# Patient Record
Sex: MALE | Race: WHITE | NOT HISPANIC OR LATINO
[De-identification: names, ages, dates, MRNs, and addresses within clinical notes are randomized per-mention and may not be internally consistent; named-entity substitution may affect disease eponyms.]

---

## 2017-01-23 ENCOUNTER — APPOINTMENT (OUTPATIENT)
Dept: OTOLARYNGOLOGY | Facility: CLINIC | Age: 77
End: 2017-01-23

## 2017-01-23 VITALS
DIASTOLIC BLOOD PRESSURE: 77 MMHG | WEIGHT: 175 LBS | HEIGHT: 72 IN | BODY MASS INDEX: 23.7 KG/M2 | SYSTOLIC BLOOD PRESSURE: 184 MMHG

## 2017-07-17 ENCOUNTER — APPOINTMENT (OUTPATIENT)
Dept: OTOLARYNGOLOGY | Facility: CLINIC | Age: 77
End: 2017-07-17

## 2017-07-17 VITALS — WEIGHT: 175 LBS | HEIGHT: 72 IN | BODY MASS INDEX: 23.7 KG/M2

## 2017-07-17 DIAGNOSIS — H81.02 MENIERE'S DISEASE, LEFT EAR: ICD-10-CM

## 2017-07-17 DIAGNOSIS — H90.5 UNSPECIFIED SENSORINEURAL HEARING LOSS: ICD-10-CM

## 2018-07-16 ENCOUNTER — APPOINTMENT (OUTPATIENT)
Dept: OTOLARYNGOLOGY | Facility: CLINIC | Age: 78
End: 2018-07-16

## 2018-08-25 ENCOUNTER — INPATIENT (INPATIENT)
Facility: HOSPITAL | Age: 78
LOS: 3 days | Discharge: HOME | End: 2018-08-29
Attending: INTERNAL MEDICINE | Admitting: INTERNAL MEDICINE
Payer: MEDICARE

## 2018-08-25 VITALS
TEMPERATURE: 97 F | HEART RATE: 76 BPM | SYSTOLIC BLOOD PRESSURE: 208 MMHG | OXYGEN SATURATION: 100 % | RESPIRATION RATE: 18 BRPM | DIASTOLIC BLOOD PRESSURE: 108 MMHG

## 2018-08-25 DIAGNOSIS — R94.31 ABNORMAL ELECTROCARDIOGRAM [ECG] [EKG]: ICD-10-CM

## 2018-08-25 DIAGNOSIS — I63.9 CEREBRAL INFARCTION, UNSPECIFIED: ICD-10-CM

## 2018-08-25 DIAGNOSIS — Z98.49 CATARACT EXTRACTION STATUS, UNSPECIFIED EYE: Chronic | ICD-10-CM

## 2018-08-25 DIAGNOSIS — S83.8X2S SPRAIN OF OTHER SPECIFIED PARTS OF LEFT KNEE, SEQUELA: ICD-10-CM

## 2018-08-25 DIAGNOSIS — I16.1 HYPERTENSIVE EMERGENCY: ICD-10-CM

## 2018-08-25 LAB
ALBUMIN SERPL ELPH-MCNC: 4.2 G/DL — SIGNIFICANT CHANGE UP (ref 3.3–5)
ALP SERPL-CCNC: 71 U/L — SIGNIFICANT CHANGE UP (ref 40–120)
ALT FLD-CCNC: 29 U/L — SIGNIFICANT CHANGE UP (ref 4–41)
APTT BLD: 29.5 SEC — SIGNIFICANT CHANGE UP (ref 27.5–37.4)
AST SERPL-CCNC: 50 U/L — HIGH (ref 4–40)
BASOPHILS # BLD AUTO: 0.06 K/UL — SIGNIFICANT CHANGE UP (ref 0–0.2)
BASOPHILS NFR BLD AUTO: 0.7 % — SIGNIFICANT CHANGE UP (ref 0–2)
BILIRUB SERPL-MCNC: 0.5 MG/DL — SIGNIFICANT CHANGE UP (ref 0.2–1.2)
BLD GP AB SCN SERPL QL: NEGATIVE — SIGNIFICANT CHANGE UP
BUN SERPL-MCNC: 20 MG/DL — SIGNIFICANT CHANGE UP (ref 7–23)
CALCIUM SERPL-MCNC: 9.5 MG/DL — SIGNIFICANT CHANGE UP (ref 8.4–10.5)
CHLORIDE SERPL-SCNC: 104 MMOL/L — SIGNIFICANT CHANGE UP (ref 98–107)
CHOLEST SERPL-MCNC: 219 MG/DL — HIGH (ref 120–199)
CO2 SERPL-SCNC: 25 MMOL/L — SIGNIFICANT CHANGE UP (ref 22–31)
CREAT SERPL-MCNC: 0.87 MG/DL — SIGNIFICANT CHANGE UP (ref 0.5–1.3)
EOSINOPHIL # BLD AUTO: 0.14 K/UL — SIGNIFICANT CHANGE UP (ref 0–0.5)
EOSINOPHIL NFR BLD AUTO: 1.6 % — SIGNIFICANT CHANGE UP (ref 0–6)
FOLATE SERPL-MCNC: > 20 NG/ML — HIGH (ref 4.7–20)
GLUCOSE SERPL-MCNC: 105 MG/DL — HIGH (ref 70–99)
HCT VFR BLD CALC: 45.5 % — SIGNIFICANT CHANGE UP (ref 39–50)
HDLC SERPL-MCNC: 47 MG/DL — SIGNIFICANT CHANGE UP (ref 35–55)
HGB BLD-MCNC: 15.6 G/DL — SIGNIFICANT CHANGE UP (ref 13–17)
IMM GRANULOCYTES # BLD AUTO: 0.03 # — SIGNIFICANT CHANGE UP
IMM GRANULOCYTES NFR BLD AUTO: 0.3 % — SIGNIFICANT CHANGE UP (ref 0–1.5)
INR BLD: 0.94 — SIGNIFICANT CHANGE UP (ref 0.88–1.17)
LIPID PNL WITH DIRECT LDL SERPL: 165 MG/DL — SIGNIFICANT CHANGE UP
LYMPHOCYTES # BLD AUTO: 2 K/UL — SIGNIFICANT CHANGE UP (ref 1–3.3)
LYMPHOCYTES # BLD AUTO: 22.9 % — SIGNIFICANT CHANGE UP (ref 13–44)
MCHC RBC-ENTMCNC: 33.1 PG — SIGNIFICANT CHANGE UP (ref 27–34)
MCHC RBC-ENTMCNC: 34.3 % — SIGNIFICANT CHANGE UP (ref 32–36)
MCV RBC AUTO: 96.6 FL — SIGNIFICANT CHANGE UP (ref 80–100)
MONOCYTES # BLD AUTO: 0.65 K/UL — SIGNIFICANT CHANGE UP (ref 0–0.9)
MONOCYTES NFR BLD AUTO: 7.5 % — SIGNIFICANT CHANGE UP (ref 2–14)
NEUTROPHILS # BLD AUTO: 5.84 K/UL — SIGNIFICANT CHANGE UP (ref 1.8–7.4)
NEUTROPHILS NFR BLD AUTO: 67 % — SIGNIFICANT CHANGE UP (ref 43–77)
NRBC # FLD: 0 — SIGNIFICANT CHANGE UP
PLATELET # BLD AUTO: 197 K/UL — SIGNIFICANT CHANGE UP (ref 150–400)
PMV BLD: 10.9 FL — SIGNIFICANT CHANGE UP (ref 7–13)
POTASSIUM SERPL-MCNC: 4.9 MMOL/L — SIGNIFICANT CHANGE UP (ref 3.5–5.3)
POTASSIUM SERPL-SCNC: 4.9 MMOL/L — SIGNIFICANT CHANGE UP (ref 3.5–5.3)
PROT SERPL-MCNC: 7.5 G/DL — SIGNIFICANT CHANGE UP (ref 6–8.3)
PROTHROM AB SERPL-ACNC: 10.8 SEC — SIGNIFICANT CHANGE UP (ref 9.8–13.1)
RBC # BLD: 4.71 M/UL — SIGNIFICANT CHANGE UP (ref 4.2–5.8)
RBC # FLD: 12 % — SIGNIFICANT CHANGE UP (ref 10.3–14.5)
RH IG SCN BLD-IMP: POSITIVE — SIGNIFICANT CHANGE UP
SODIUM SERPL-SCNC: 142 MMOL/L — SIGNIFICANT CHANGE UP (ref 135–145)
TRIGL SERPL-MCNC: 91 MG/DL — SIGNIFICANT CHANGE UP (ref 10–149)
VIT B12 SERPL-MCNC: 2000 PG/ML — HIGH (ref 200–900)
WBC # BLD: 8.72 K/UL — SIGNIFICANT CHANGE UP (ref 3.8–10.5)
WBC # FLD AUTO: 8.72 K/UL — SIGNIFICANT CHANGE UP (ref 3.8–10.5)

## 2018-08-25 PROCEDURE — 33282: CPT

## 2018-08-25 PROCEDURE — 70450 CT HEAD/BRAIN W/O DYE: CPT | Mod: 26,59

## 2018-08-25 PROCEDURE — 70496 CT ANGIOGRAPHY HEAD: CPT | Mod: 26

## 2018-08-25 PROCEDURE — 70551 MRI BRAIN STEM W/O DYE: CPT | Mod: 26

## 2018-08-25 PROCEDURE — 70498 CT ANGIOGRAPHY NECK: CPT | Mod: 26

## 2018-08-25 PROCEDURE — 99223 1ST HOSP IP/OBS HIGH 75: CPT

## 2018-08-25 RX ORDER — ATORVASTATIN CALCIUM 80 MG/1
80 TABLET, FILM COATED ORAL AT BEDTIME
Qty: 0 | Refills: 0 | Status: DISCONTINUED | OUTPATIENT
Start: 2018-08-25 | End: 2018-08-29

## 2018-08-25 RX ORDER — CLOPIDOGREL BISULFATE 75 MG/1
75 TABLET, FILM COATED ORAL DAILY
Qty: 0 | Refills: 0 | Status: DISCONTINUED | OUTPATIENT
Start: 2018-08-25 | End: 2018-08-29

## 2018-08-25 RX ORDER — ASPIRIN/CALCIUM CARB/MAGNESIUM 324 MG
81 TABLET ORAL DAILY
Qty: 0 | Refills: 0 | Status: DISCONTINUED | OUTPATIENT
Start: 2018-08-25 | End: 2018-08-29

## 2018-08-25 RX ORDER — LABETALOL HCL 100 MG
10 TABLET ORAL ONCE
Qty: 0 | Refills: 0 | Status: COMPLETED | OUTPATIENT
Start: 2018-08-25 | End: 2018-08-25

## 2018-08-25 RX ADMIN — Medication 10 MILLIGRAM(S): at 11:20

## 2018-08-25 NOTE — H&P ADULT - PROBLEM SELECTOR PLAN 5
- no pain, tenderness, difficulty with ROM at present  - reports undergoing MRI study of said knee in the recent past  - patient reports temporary limitations in exercising due to same  - evaluate need for physical therapy

## 2018-08-25 NOTE — ED PROVIDER NOTE - MEDICAL DECISION MAKING DETAILS
Pt is a 79 y/o M nonsmoker PMHx cataract surgery p/w difficulty coordinating fingers x 3.5 hrs -- difficulty coordinating without objective neuro deficits -- labs, code stroke Pt is a 77 y/o M nonsmoker PMHx cataract surgery p/w difficulty coordinating fingers x 3.5 hrs -- difficulty coordinating without objective neuro deficits -- labs, code stroke, Bp contro.

## 2018-08-25 NOTE — H&P ADULT - PROBLEM SELECTOR PLAN 1
- presented with difficulty in maintaining fluid movements of fingers while typing  - BP = 208/108 on admission (HR = 76)  - MRI with findings of multiple foci of emboli w/o hemorrhage  - s/p Stroke code and now being followed by neurology team (appreciated)  - recommended for plavix, aspirin, simvastatin by Neuro; patient refuses all, despite understanding indications, benefits, risks  - patient takes multiple high dose supplements (see below), and believes he does not need the prescribed meds; thinks that his lipid panel differential is at target for age  - f/u blood cultures, TTE (as recommended by Neuro)  - pls call cardiology consult for Loop recorder (as recommended by Neuro)  - f/u AM lab-work  - neurological checks - presented with difficulty in maintaining fluid movements of fingers while typing  - BP = 208/108 on admission (HR = 76)  - MRI with findings of multiple foci of emboli w/o hemorrhage  - s/p Stroke code and now being followed by neurology team (appreciated)  - recommended for plavix, aspirin, simvastatin by Neuro; patient refuses all, despite understanding indications, benefits, risks  - patient takes multiple high dose supplements (see below), and believes he does not need the prescribed meds; thinks that his lipid panel differential is at target for age  - f/u blood cultures, TTE (as recommended by Neuro)  - pls call cardiology consult for Loop recorder (as recommended by Neuro)  - f/u AM lab-work (lipid panel, HgbA1c, vitamin B12, folate levels already done_  - neurological checks  - continues on telemetry monitoring - presented with difficulty in maintaining fluid movements of fingers while typing  - BP = 208/108 on admission (HR = 76)  - MRI with findings of multiple foci of emboli w/o hemorrhage  - s/p Stroke code and now being followed by neurology team (appreciated)  - recommended for plavix, aspirin, simvastatin by Neuro; patient refuses all, despite understanding indications, benefits, risks  - patient takes multiple high dose supplements (see below), and believes he does not need the prescribed meds; thinks that his lipid panel differential is at target for age  - f/u blood cultures, TTE (as recommended by Neuro)  - pls call cardiology consult for Loop recorder (as recommended by Neuro)  - f/u AM lab-work (lipid panel, HgbA1c, vitamin B12, folate levels already done_  - neurological checks  - continues on telemetry monitoring  - passed swallow eval  - F/U PT/OT eval

## 2018-08-25 NOTE — H&P ADULT - PROBLEM SELECTOR PLAN 2
- BP to 208/108 on admission; not on antihypertensive medications at home  - patient reports history of labile blood pressure, with normal values being 120 to 130 systolic and 60 to 70 diastolic.  During stressful periods, however, BP significantly elevation.  Current elevation explained as being due to the assumption that he was having a CVA at the time of coming to the ED  - s/p labetalol 10 mg IV x one in the ED  - SBP from 129 to 156 since being in the Hospital  - Neurology team recommends permissive HTN of  to 180 for 24 to 48 hours  - ECG as above  - f/u with repeat measurements - BP to 208/108 on admission and now with CVA; not on antihypertensive medications at home  - patient reports history of labile blood pressure, with normal values being 120 to 130 systolic and 60 to 70 diastolic.  During stressful periods, however, BP significantly elevation.  Current elevation explained as being due to the assumption that he was having a CVA at the time of coming to the ED  - s/p labetalol 10 mg IV x one in the ED  - SBP from 129 to 156 since being in the Hospital  - Neurology team recommends permissive HTN of  to 180 for 24 to 48 hours  - ECG as above  - f/u with repeat measurements

## 2018-08-25 NOTE — ED ADULT NURSE NOTE - OBJECTIVE STATEMENT
pt received to room 22 A/O x 3 c/o uncoordination in left and right hands. Pt noticed more uncoordination to his right hand. Pt states he was typing and noticed he wasn't able to hit the proper keys. No facal droop noted. Pt able to move with equal strength and sensation bilateral upper and lower extremities without difficulty. Respirations even and unlabored. IV placed labs drawn and sent. Code stroke called. Pt sent to CT Scan. pt received to room 22 A/O x 3 c/o uncoordination in left and right hands. Pt noticed more uncoordination to his right hand. Pt states he was typing and noticed he wasn't able to hit the proper keys. No facal droop noted. Pt able to move with equal strength and sensation bilateral upper and lower extremities without difficulty. Respirations even and unlabored. IV placed labs drawn and sent. MD evaluated pt and Code stroke called. Pt sent to CT Scan. pt received to room 22 A/O x 3 c/o uncoordination in left and right hands. Pt noticed more uncoordination to his right hand. Pt states he was typing and noticed he wasn't able to hit the proper keys. PERRLA. No facial droop noted. Pt able to move with equal strength and sensation bilateral upper and lower extremities without difficulty. Respirations even and unlabored. Lung sounds clear with equal chest rise bilaterally. ABD is soft, non tender, non distended with normal active bowel sounds.  IV placed labs drawn and sent. MD evaluated pt and Code stroke called. Pt sent to CT Scan.

## 2018-08-25 NOTE — CHART NOTE - NSCHARTNOTEFT_GEN_A_CORE
MRI Brain reviewed.    < from: MR Head No Cont (08.25.18 @ 15:54) >    IMPRESSION: Scattered infarcts in the right occipital lobe, left centrum   semiovale ovale and bridgette suspicious for multiple emboli. No acute   hemorrhage.Atrophy and small vessel white matter ischemic changes.    < end of copied text >    Multi vessel shower emboli, cardioembolic source likely 2/2 to afib until proven otherwise  - d/w patient and primary team in CDU  - Admit to Tele for further stroke w/u  - Please send set of blood cultures   - TTE w/ bubble study  - atorvastatin 80  - c/w aspirin 81, plavix 75 for 3 weeks then aspirin alone  - Cardiology eval for Loop recorder  - will follow MRI Brain reviewed.    < from: MR Head No Cont (08.25.18 @ 15:54) >    IMPRESSION: Scattered infarcts in the right occipital lobe, left centrum   semiovale ovale and bridgette suspicious for multiple emboli. No acute   hemorrhage.Atrophy and small vessel white matter ischemic changes.    < end of copied text >    Multi vessel shower emboli, cardioembolic source likely 2/2 to afib until proven otherwise  - d/w patient and primary team in CDU  - Admit to Tele for further stroke w/u  - Please send set of blood cultures   - TTE w/ bubble study  - atorvastatin 80  - c/w aspirin 81, plavix 75 for 3 weeks then aspirin alone  - Cardiology eval for Loop recorder  - PT/OT   - allow permissive HTN 24-48 hours -180  - neuro to follow

## 2018-08-25 NOTE — CONSULT NOTE ADULT - ATTENDING COMMENTS
I have examined the pt at bedside.  The risks and the benefits of the proposed diagnostic/therapeutic approach were thoroughly discussed.   I agree with the above plan and have modified it where necessary.

## 2018-08-25 NOTE — ED ADULT TRIAGE NOTE - CHIEF COMPLAINT QUOTE
done during down time charting/  r/o cva pt unsteady c/o feeling off/ hands shaking pt evaluated by fit doc  no code stroke called but pt to room

## 2018-08-25 NOTE — ED ADULT NURSE NOTE - CHPI ED NUR SYMPTOMS NEG
no nausea/no vomiting/no weakness/no blurred vision/no dizziness/no loss of consciousness/no numbness/no change in level of consciousness/no confusion/no fever

## 2018-08-25 NOTE — H&P ADULT - NSHPLABSRESULTS_GEN_ALL_CORE
15.6   8.72  )-----------( 197      ( 25 Aug 2018 10:49 )             45.5       08-25    142  |  104  |  20  ----------------------------<  105<H>  4.9   |  25  |  0.87    Ca    9.5      25 Aug 2018 10:49    TPro  7.5  /  Alb  4.2  /  TBili  0.5  /  DBili  x   /  AST  50<H>  /  ALT  29  /  AlkPhos  71  08-25          PT/INR - ( 25 Aug 2018 10:49 )   PT: 10.8 SEC;   INR: 0.94          PTT - ( 25 Aug 2018 10:49 )  PTT:29.5 SEC    Lactate Trend      CAPILLARY BLOOD GLUCOSE  101 (25 Aug 2018 11:19)      POCT Blood Glucose.: 101 mg/dL (25 Aug 2018 10:37)        =============================================================    MRI - BRAIN    PROCEDURE DATE:  Aug 25 2018       INTERPRETATION:    CLINICAL INDICATION: Difficulty with coordination of bilateral hands      Magnetic resonance imaging of the brain was carried out with transaxial   SPGR, FLAIR, fast spin echo T2 weighted images, axial susceptibility   weighted series, diffusion weighted series and sagittal T1 weighted   series on a 1.5 Ai magnet.    Comparison is made with the prior CT/CTA of earlier today.    Atrophy is identified with ventricular and sulcal prominence.    There are multiple tiny scattered infarcts on diffusion-weighted imaging   in the left centrum semiovale ovale, adjacent to the left lateral   ventricle, and the left parietal white matter and in the right occipital   region. Additionally there are a few scattered apparent areas of   restriction in the bridgette bilaterally. These are suspicious for embolic   infarcts in view of the multiple vascular territory involvement. Small   vessel white matter ischemic changes are noted.    There is no acute hemorrhage.    Sellar and parasellar structures are unremarkable.      IMPRESSION: Scattered infarcts in the right occipital lobe, left centrum   semiovale ovale and bridgette suspicious for multiple emboli. No acute   hemorrhage.Atrophy and small vessel white matter ischemic changes.    Findings stress with Dr. Mon by Dr. Weiss at  time of dictation with   read back    < end of copied text >        ------------------------------------------------------------------------------------------ 15.6   8.72  )-----------( 197      ( 25 Aug 2018 10:49 )             45.5       08-25    142  |  104  |  20  ----------------------------<  105<H>  4.9   |  25  |  0.87    Ca    9.5      25 Aug 2018 10:49    TPro  7.5  /  Alb  4.2  /  TBili  0.5  /  DBili  x   /  AST  50<H>  /  ALT  29  /  AlkPhos  71  08-25          PT/INR - ( 25 Aug 2018 10:49 )   PT: 10.8 SEC;   INR: 0.94          PTT - ( 25 Aug 2018 10:49 )  PTT:29.5 SEC    Lactate Trend      CAPILLARY BLOOD GLUCOSE  101 (25 Aug 2018 11:19)      POCT Blood Glucose.: 101 mg/dL (25 Aug 2018 10:37)        =============================================================    MRI - BRAIN    PROCEDURE DATE:  Aug 25 2018       INTERPRETATION:    CLINICAL INDICATION: Difficulty with coordination of bilateral hands      Magnetic resonance imaging of the brain was carried out with transaxial   SPGR, FLAIR, fast spin echo T2 weighted images, axial susceptibility   weighted series, diffusion weighted series and sagittal T1 weighted   series on a 1.5 Ai magnet.    Comparison is made with the prior CT/CTA of earlier today.    Atrophy is identified with ventricular and sulcal prominence.    There are multiple tiny scattered infarcts on diffusion-weighted imaging   in the left centrum semiovale ovale, adjacent to the left lateral   ventricle, and the left parietal white matter and in the right occipital   region. Additionally there are a few scattered apparent areas of   restriction in the bridgette bilaterally. These are suspicious for embolic   infarcts in view of the multiple vascular territory involvement. Small   vessel white matter ischemic changes are noted.    There is no acute hemorrhage.    Sellar and parasellar structures are unremarkable.      IMPRESSION: Scattered infarcts in the right occipital lobe, left centrum   semiovale ovale and bridgette suspicious for multiple emboli. No acute   hemorrhage.Atrophy and small vessel white matter ischemic changes.    Findings stress with Dr. Mon by Dr. Weiss at  time of dictation with   read back    < end of copied text >      ------------------------------------------------------------------------------------------      ECG w/ NSR at 73 bpm, LAD, incomplete LBBB, small U-waves

## 2018-08-25 NOTE — ED CDU PROVIDER DISPOSITION NOTE - CLINICAL COURSE
Attending Contribution to Care: Patient is a 77 yo M here for further evaluation of questionable TIA. Patient reportedly states he couldn't move his fingers to touch the keys on the keyboard. Episode lasted about 15 min. He is differentiating this from strange sensation in both of his shoulders which is unable to further describe, but states this lasted a few hours. He states he has no symptoms at the time of my interview. Denies chest pain, sob, headache, weakness, fevers, chills. Patient was seen by neurology, had a CTA of his head and neck done with no acute findings. He is in CDU for MRI head, neuro checks and tele monitoring. Patient had MRI head which showed multiple emboli in right occipital region. Discussed with neuro who requested admission. Neurology resident spoke to Dr. Red and patient admitted to tele.

## 2018-08-25 NOTE — ED ADULT NURSE NOTE - NSIMPLEMENTINTERV_GEN_ALL_ED
Implemented All Universal Safety Interventions:  [place] to call system. Call bell, personal items and telephone within reach. Instruct patient to call for assistance. Room bathroom lighting operational. Non-slip footwear when patient is off stretcher. Physically safe environment: no spills, clutter or unnecessary equipment. Stretcher in lowest position, wheels locked, appropriate side rails in place.

## 2018-08-25 NOTE — H&P ADULT - NEGATIVE NEUROLOGICAL SYMPTOMS
no tremors/no vertigo/no loss of consciousness/no syncope/no difficulty walking/no confusion/no headache

## 2018-08-25 NOTE — H&P ADULT - FAMILY HISTORY
No pertinent family history in first degree relatives Mother  Still living? No  Family history of mental disorder, Age at diagnosis: Age Unknown     Father  Still living? No  Family history of heart disease, Age at diagnosis: Age Unknown

## 2018-08-25 NOTE — H&P ADULT - PROBLEM SELECTOR PLAN 3
- ECG w/ NSR at 73 bpm, LAD, incomplete LBBB, small U-waves  - no reports of palpitations, chest pain, shortness of breath  - findings may be chronic in patient who is very active (ran 3 marathons and works out constantly)  - does not have a PMD  - cardiology consult already indicated for Loop recorder

## 2018-08-25 NOTE — CONSULT NOTE ADULT - SUBJECTIVE AND OBJECTIVE BOX
[facility] NEUROLOGY CONSULT SERVICE  ROE BENNETT  Male  MRN-[id number]    HPI: 79 y/o M nonsmoker PMHx undiagnosed HTN, cataract surgery p/w difficulty coordinating fingers x 3.5 hrs.  Pt notes ~ 7AM while at computer, typing, pt states he developed difficulty "typing as fast as usual" with difficulty worse with right hand.  Pt also notes "feeling funny" at the skin around his head and neck, but cannot elaborate further.  Pt notes symptoms minimally improved at this time, stating he is able to move finger and hands faster than before, but slower than baseline. Pt was noted to have SBP in lower 200s as per ED team.   At the time of my eval, Pt reports he feels like his usual self, confirmed by wife also. No facial asymmetry dysarthria, aphasia or gross abnormalities reported. Very mild subjective diminished sensation to light touch only in left foot dorsal surface. Pt states his BP was in 180s systolic earlier this morning but usually is in 120-130s range. He has not followed with any doctors in a long time and does not take any medications except for vitamin/supplements.     Pt denies any fevers, chills,  HA, dizziness, palpitations, nausea, vomiting, chest pain, SOB, headache, numbness or tingling, weakness, head trauma, use of blood thinners, illicit drug use.    NIHSS =1, MRS= 0    PAST MEDICAL & SURGICAL HISTORY:  No pertinent past medical history  S/P cataract surgery, HTN    MEDICATIONS  (STANDING):    MEDICATIONS  (PRN):    Allergies    No Known Allergies    Intolerances        SOCIAL HISTORY:      VITAL SIGNS:  Vital Signs Last 24 Hrs  T(C): 36.7 (25 Aug 2018 12:50), Max: 36.7 (25 Aug 2018 12:50)  T(F): 98.1 (25 Aug 2018 12:50), Max: 98.1 (25 Aug 2018 12:50)  HR: 60 (25 Aug 2018 12:50) (58 - 76)  BP: 142/70 (25 Aug 2018 12:50) (142/70 - 208/108)  BP(mean): --  RR: 16 (25 Aug 2018 12:50) (16 - 18)  SpO2: 98% (25 Aug 2018 12:50) (97% - 100%)    PHYSICAL EXAMINATION:  General: Well-developed, well nourished, in no acute distress. yellowing dentition  Eyes: Conjunctiva and sclera clear.  Neurologic:  - Mental Status:  Alert, awake, oriented to person, place, and time; Speech is fluent with intact naming, repetition, and comprehension; Immediate recall is 3/3 words and delayed recall is 3/3 words at 5 minutes;   - Cranial Nerves II-XII:    II:  VFF, Pupils are equal, round, and reactive to light.  III, IV, VI:  Extraocular movements are intact without nystagmus.  V:  Facial sensation is intact in the V1-V3 distribution bilaterally.  VII:  Face is symmetric with normal eye closure and smile  VIII:  Hearing is intact to finger rub.  IX, X:  Uvula is midline and soft palate rises symmetrically  XI:  Head turning and shoulder shrug are intact.  XII:  Tongue protrudes in the midline.  - Motor:  Strength is 5/5 throughout.  There is no pronator drift.  Normal muscle bulk and tone throughout.  - Reflexes:  2+ and symmetric at the biceps, triceps, brachioradialis, knees, and ankles.  Plantar responses flexor.  - Sensory:  Intact to light touch, pin prick, throughout except for mildly diminished sense on dorsal surface of left  foot  - Coordination:  Finger-nose-finger and heel-knee-shin intact without dysmetria.  Rapid alternating hand movements mild difficulty  - Gait:   deferred    LABS:                          15.6   8.72  )-----------( 197      ( 25 Aug 2018 10:49 )left              45.5     08-25    142  |  104  |  20  ----------------------------<  105<H>  4.9   |  25  |  0.87    Ca    9.5      25 Aug 2018 10:49    TPro  7.5  /  Alb  4.2  /  TBili  0.5  /  DBili  x   /  AST  50<H>  /  ALT  29  /  AlkPhos  71  08-25      RADIOLOGY & ADDITIONAL STUDIES:   < from: CT Brain Stroke Protocol (08.25.18 @ 10:56) >  IMPRESSION:    No acute intracranial hemorrhage, mass effect or midline shift.    The above findings were discussed with Dr. Mon by Dr. Deleon on 8/25/2018   10:55 AM, with read-back verification.    < end of copied text >     < from: CT Angio Neck w/ IV Cont (08.25.18 @ 10:56) >  IMPRESSION:     CTA NECK: No evidence of hemodynamically significant stenosis using   NASCET criteria. No evidence of arterial dissection.    CTA BRAIN: No major vessel occlusion or proximal stenosis.  Normal   anatomic variants as discussed in the body the report.    < end of copied text >      A/P   79 y/o M nonsmoker PMHx undiagnosed HTN, cataract surgery p/w difficulty coordinating fingers that occurred around 7am, now back to his baseline. Otherwise no focal deficits noted except for mild sensory deficit to light touch in dorsal surface of left foot. CT/CTA without acute findings. NIHSS =1, MRS =0    - place in CDU for Telemonitoring  - MRI Brain w/o contrast  - aspirin 81 daily  - plavix 75mg (to continue x 3 weeks)   - Lipid panel, HgA1c  - Kindly inform us if pt's condition/situation changes  - will follow    Thank you for involving us in the care of this patient. [facility] NEUROLOGY CONSULT SERVICE  ROE BENNETT  Male  MRN-[id number]    HPI: 79 y/o M nonsmoker PMHx undiagnosed HTN, cataract surgery p/w difficulty coordinating fingers x 3.5 hrs.  Pt notes ~ 7AM while at computer, typing, pt states he developed difficulty "typing as fast as usual" with difficulty worse with right hand.  Pt also notes "feeling funny" at the skin around his head and neck, but cannot elaborate further.  Pt notes symptoms minimally improved at this time, stating he is able to move finger and hands faster than before, but slower than baseline. Pt was noted to have SBP in lower 200s as per ED team.   At the time of my eval, Pt reports he feels like his usual self, confirmed by wife also. No facial asymmetry dysarthria, aphasia or gross abnormalities reported. Very mild subjective diminished sensation to light touch only in left foot dorsal surface. Pt states his BP was in 180s systolic earlier this morning but usually is in 120-130s range. He has not followed with any doctors in a long time and does not take any medications except for vitamin/supplements.     Pt denies any fevers, chills,  HA, dizziness, palpitations, nausea, vomiting, chest pain, SOB, headache, numbness or tingling, weakness, head trauma, use of blood thinners, illicit drug use.    NIHSS =1, MRS= 0    PAST MEDICAL & SURGICAL HISTORY:  No pertinent past medical history  S/P cataract surgery, HTN    MEDICATIONS  (STANDING):    MEDICATIONS  (PRN):    Allergies    No Known Allergies    Intolerances        SOCIAL HISTORY:      VITAL SIGNS:  Vital Signs Last 24 Hrs  T(C): 36.7 (25 Aug 2018 12:50), Max: 36.7 (25 Aug 2018 12:50)  T(F): 98.1 (25 Aug 2018 12:50), Max: 98.1 (25 Aug 2018 12:50)  HR: 60 (25 Aug 2018 12:50) (58 - 76)  BP: 142/70 (25 Aug 2018 12:50) (142/70 - 208/108)  BP(mean): --  RR: 16 (25 Aug 2018 12:50) (16 - 18)  SpO2: 98% (25 Aug 2018 12:50) (97% - 100%)    PHYSICAL EXAMINATION:  General: Well-developed, well nourished, in no acute distress. yellowing dentition  Eyes: Conjunctiva and sclera clear.  Neurologic:  - Mental Status:  Alert, awake, oriented to person, place, and time; Speech is fluent with intact naming, repetition, and comprehension; Immediate recall is 3/3 words and delayed recall is 3/3 words at 5 minutes;   - Cranial Nerves II-XII:    II:  VFF, Pupils are equal, round, and reactive to light.  III, IV, VI:  Extraocular movements are intact without nystagmus.  V:  Facial sensation is intact in the V1-V3 distribution bilaterally.  VII:  Face is symmetric with normal eye closure and smile  VIII:  Hearing is intact to finger rub.  IX, X:  Uvula is midline and soft palate rises symmetrically  XI:  Head turning and shoulder shrug are intact.  XII:  Tongue protrudes in the midline.  - Motor:  Strength is 5/5 throughout.  There is no pronator drift.  Normal muscle bulk and tone throughout.  - Reflexes:  2+ and symmetric at the biceps, triceps, brachioradialis, knees, and ankles.  Plantar responses flexor.  - Sensory:  Intact to light touch, pin prick, throughout except for mildly diminished sense on dorsal surface of left  foot  - Coordination:  Finger-nose-finger and heel-knee-shin intact without dysmetria.  Rapid alternating hand movements mild difficulty  - Gait:   deferred    LABS:                          15.6   8.72  )-----------( 197      ( 25 Aug 2018 10:49 )left              45.5     08-25    142  |  104  |  20  ----------------------------<  105<H>  4.9   |  25  |  0.87    Ca    9.5      25 Aug 2018 10:49    TPro  7.5  /  Alb  4.2  /  TBili  0.5  /  DBili  x   /  AST  50<H>  /  ALT  29  /  AlkPhos  71  08-25      RADIOLOGY & ADDITIONAL STUDIES:   < from: CT Brain Stroke Protocol (08.25.18 @ 10:56) >  IMPRESSION:    No acute intracranial hemorrhage, mass effect or midline shift.    The above findings were discussed with Dr. Mon by Dr. Deleon on 8/25/2018   10:55 AM, with read-back verification.    < end of copied text >     < from: CT Angio Neck w/ IV Cont (08.25.18 @ 10:56) >  IMPRESSION:     CTA NECK: No evidence of hemodynamically significant stenosis using   NASCET criteria. No evidence of arterial dissection.    CTA BRAIN: No major vessel occlusion or proximal stenosis.  Normal   anatomic variants as discussed in the body the report.    < end of copied text >      A/P   79 y/o M nonsmoker PMHx undiagnosed HTN, cataract surgery p/w difficulty coordinating fingers that occurred around 7am, now back to his baseline. Otherwise no focal deficits noted except for mild sensory deficit to light touch in dorsal surface of left foot. CT/CTA without acute findings. NIHSS =1, MRS =0    - place in CDU for Telemonitoring  - MRI Brain w/o contrast  - aspirin 81 daily  - plavix 75mg (to continue x 3 weeks)   - Lipid panel, HgA1c, b12, folate  - Kindly inform us if pt's condition/situation changes  - will follow    Thank you for involving us in the care of this patient. [facility] NEUROLOGY CONSULT SERVICE  ROE BENNETT  Male  MRN-[id number]    HPI: 79 y/o M nonsmoker PMHx undiagnosed HTN, cataract surgery p/w difficulty coordinating fingers x 3.5 hrs.  Pt notes ~ 7AM while at computer, typing, pt states he developed difficulty "typing as fast as usual" with difficulty worse with right hand.  Pt also notes "feeling funny" at the skin around his head and neck, but cannot elaborate further.  Pt notes symptoms minimally improved at this time, stating he is able to move finger and hands faster than before, but slower than baseline. Pt was noted to have SBP in lower 200s as per ED team.   At the time of my eval, Pt reports he feels like his usual self, confirmed by wife also. No facial asymmetry dysarthria, aphasia or gross abnormalities reported. Very mild subjective diminished sensation to light touch only in left foot dorsal surface. Pt states his BP was in 180s systolic earlier this morning but usually is in 120-130s range. He has not followed with any doctors in a long time and does not take any medications except for vitamin/supplements.     Pt denies any fevers, chills,  HA, dizziness, palpitations, nausea, vomiting, chest pain, SOB, headache, numbness or tingling, weakness, head trauma, use of blood thinners, illicit drug use.    NIHSS =1, MRS= 0    Interval Hx: pt feels well today, no complaints.    PAST MEDICAL & SURGICAL HISTORY:  No pertinent past medical history  S/P cataract surgery, HTN    Social Hx: Lives with family. No toxic habits.   FAMILY HISTORY:  Family history of heart disease (Father): father (sudden death at age 56 years; heart disease presumed to be due to Rheumatic heart disease  Family history of mental disorder (Mother): mother ( at age 91 years)    Home Medications:  boswellia alexis: orally once a day (25 Aug 2018 22:12)  chlorpheniramine: orally once a day, As Needed (25 Aug 2018 20:11)  Ginkgo Biloba: orally once a day (25 Aug 2018 22:11)  Multiple Vitamins oral tablet: 1 tab(s) orally once a day (25 Aug 2018 22:13)  Vitamin B Complex 100:  (25 Aug 2018 22:08)  Vitamin B12:  (25 Aug 2018 22:08)  Vitamin C 1000 mg oral tablet: 6 tab(s) orally once a day (6 grams daily) (25 Aug 2018 22:08)  Vitamin D3 10,000 intl units oral capsule: 1 cap(s) orally once a day - during the summer months (25 Aug 2018 22:09)  Vitamin D3 1000 intl units oral tablet: 12 tab(s) orally once a day (12,000 IU daily during winter months) (25 Aug 2018 22:10)    MEDICATIONS  (STANDING):  aspirin enteric coated 81 milliGRAM(s) Oral daily  atorvastatin 80 milliGRAM(s) Oral at bedtime  clopidogrel Tablet 75 milliGRAM(s) Oral daily    MEDICATIONS  (PRN): none.    Allergies  No Known Allergies    GENERAL EXAM:  Constitutional: no acute distress, well nourished, well developed   Cardiovascular: RRR, S1/2 normal, no MRG  Musculoskeletal:  no clubbing or cyanosis of the fingernails, no joint swelling, no myalgia  Eyes:  normal sclera and conjunctiva and pupils, no JODIE, no APD. Fundi: no papilledema.  VITAL SIGNS:  Vital Signs Last 24 Hrs  T(C): 36.7 (25 Aug 2018 12:50), Max: 36.7 (25 Aug 2018 12:50)  T(F): 98.1 (25 Aug 2018 12:50), Max: 98.1 (25 Aug 2018 12:50)  HR: 60 (25 Aug 2018 12:50) (58 - 76)  BP: 142/70 (25 Aug 2018 12:50) (142/70 - 208/108)  RR: 16 (25 Aug 2018 12:50) (16 - 18)  SpO2: 98% (25 Aug 2018 12:50) (97% - 100%)    PHYSICAL EXAMINATION:  Neurologic:  - Mental Status:  Alert, awake, oriented to person, place, and time; Speech is fluent with intact naming, repetition, and comprehension; Immediate recall is 3/3 words and delayed recall is 3/3 words at 5 minutes;   - Cranial Nerves II-XII:    II:  VFF, Pupils are equal, round, and reactive to light.  III, IV, VI:  Extraocular movements are intact without nystagmus.  V:  Facial sensation is intact in the V1-V3 distribution bilaterally.  VII:  Face is symmetric with normal eye closure and smile  VIII:  Hearing is intact to finger rub.  IX, X:  Uvula is midline and soft palate rises symmetrically  XI:  Head turning and shoulder shrug are intact.  XII:  Tongue protrudes in the midline.  - Motor:  Strength is 5/5 throughout.  There is no pronator drift.  Normal muscle bulk and tone throughout.  - Reflexes:  2+ and symmetric at the biceps, triceps, brachioradialis, knees, and ankles.  Plantar responses flexor.  - Sensory:  Intact to light touch, pin prick, throughout except for mildly diminished sense on dorsal surface of left  foot  - Coordination:  Finger-nose-finger and heel-knee-shin intact without dysmetria.  Rapid alternating hand movements mild difficulty  - Gait:   deferred    LABS:                          15.6   8.72  )-----------( 197      ( 25 Aug 2018 10:49 )left              45.5         142  |  104  |  20  ----------------------------<  105<H>  4.9   |  25  |  0.87    Ca    9.5      25 Aug 2018 10:49    TPro  7.5  /  Alb  4.2  /  TBili  0.5  /  DBili  x   /  AST  50<H>  /  ALT  29  /  AlkPhos  71        RADIOLOGY & ADDITIONAL STUDIES:   < from: CT Brain Stroke Protocol (18 @ 10:56) >  IMPRESSION:    No acute intracranial hemorrhage, mass effect or midline shift.    The above findings were discussed with Dr. Mon by Dr. Deleon on 2018   10:55 AM, with read-back verification.    < end of copied text >     < from: CT Angio Neck w/ IV Cont (18 @ 10:56) >  IMPRESSION:     CTA NECK: No evidence of hemodynamically significant stenosis using   NASCET criteria. No evidence of arterial dissection.    CTA BRAIN: No major vessel occlusion or proximal stenosis.  Normal   anatomic variants as discussed in the body the report.    < end of copied text >    < from: MR Head No Cont (18 @ 15:54) >  Magnetic resonance imaging of the brain was carried out with transaxial   SPGR, FLAIR, fast spin echo T2 weighted images, axial susceptibility   weighted series, diffusion weighted series and sagittal T1 weighted   series on a 1.5 Ai magnet.    Comparison is made with the prior CT/CTA of earlier today.    Atrophy is identified with ventricular and sulcal prominence.    There are multiple tiny scattered infarcts on diffusion-weighted imaging   in the left centrum semiovale ovale, adjacent to the left lateral   ventricle, and the left parietal white matter and in the right occipital   region. Additionally there are a few scattered apparent areas of   restriction in the bridgette bilaterally. These are suspicious for embolic   infarcts in view of the multiple vascular territory involvement. Small   vessel white matter ischemic changes are noted.    There is no acute hemorrhage.    Sellar and parasellar structures are unremarkable.    < end of copied text >

## 2018-08-25 NOTE — H&P ADULT - NSHPSOCIALHISTORY_GEN_ALL_CORE
Lives with wife  No history of smoking   Lives with wife  Retired, but formerly worked as a ,  and other related computer technologies  No history of smoking  No history of alcohol use  No history of caffeine use  No history of illegal drug use    Independently ambulatory at baseline; runs marathons

## 2018-08-25 NOTE — ED CDU PROVIDER INITIAL DAY NOTE - OBJECTIVE STATEMENT
Pt is a 79 y/o M nonsmoker PMHx cataract surgery p/w difficulty coordinating fingers x 3.5 hrs.  Pt notes 3.5 hrs ago while at computer, typing, pt states he developed difficulty "typing as fast as usual" with difficulty worse with right hand.  Pt also notes "feeling funny" at the skin around his head and neck, but cannot elaborate further.  Pt notes symptoms minimally improved at this time, stating he is able to move finger and hands faster than before, but slower than baseline.  Pt denies any fevers, chills, nausea, vomiting, chest pain, SOB, headache, numbness, weakness, head trauma, use of blood thinners, illicit drug use.  Seen initially and triage and no abn findings found and sent to room. In room had mild hyprreflexia, but no other acute findgins, and code stroke called.    CDU PA: Reviewed above, pt was evaluated by neurology, had CTA head and nek done in ED, no acute findings, patient back to baseline.  Sent to CDU for MRI head, tele monitoring and neuro checks.  Pt asymptomatic.  States has no pmhx however does not have a PMD.

## 2018-08-25 NOTE — H&P ADULT - NEGATIVE ENMT SYMPTOMS
no ear pain/no gum bleeding/no dry mouth/no nose bleeds/no hearing difficulty/no sinus symptoms/no vertigo

## 2018-08-25 NOTE — ED CDU PROVIDER INITIAL DAY NOTE - MEDICAL DECISION MAKING DETAILS
r/o TIA  - MRI head, tele monitor, neuro checks, neuro re-eval r/o TIA  - MRI head, tele monitor, neuro checks, neuro re-eval  Sherri YODER: I agree with above assessment and plan.

## 2018-08-25 NOTE — ED PROVIDER NOTE - OBJECTIVE STATEMENT
Pt is a 77 y/o M nonsmoker PMHx cataract surgery p/w difficulty coordinating fingers x 3.5 hrs.  Pt notes 3.5 hrs ago while at computer, typing, pt states he developed difficulty "typing as fast as usual" with difficulty worse with right hand.  Pt also notes "feeling funny" at the skin around his head and neck, but cannot elaborate further.  Pt notes symptoms minimally improved at this time, stating he is able to move finger and hands faster than before, but slower than baseline. Pt is a 79 y/o M nonsmoker PMHx cataract surgery p/w difficulty coordinating fingers x 3.5 hrs.  Pt notes 3.5 hrs ago while at computer, typing, pt states he developed difficulty "typing as fast as usual" with difficulty worse with right hand.  Pt also notes "feeling funny" at the skin around his head and neck, but cannot elaborate further.  Pt notes symptoms minimally improved at this time, stating he is able to move finger and hands faster than before, but slower than baseline.  Pt denies any fevers, chills, nausea, vomiting, chest pain, SOB, headache, numbness, weakness, head trauma, use of blood thinners, illicit drug use. Pt is a 79 y/o M nonsmoker PMHx cataract surgery p/w difficulty coordinating fingers x 3.5 hrs.  Pt notes 3.5 hrs ago while at computer, typing, pt states he developed difficulty "typing as fast as usual" with difficulty worse with right hand.  Pt also notes "feeling funny" at the skin around his head and neck, but cannot elaborate further.  Pt notes symptoms minimally improved at this time, stating he is able to move finger and hands faster than before, but slower than baseline.  Pt denies any fevers, chills, nausea, vomiting, chest pain, SOB, headache, numbness, weakness, head trauma, use of blood thinners, illicit drug use.  Seen initially and triage and no abn findings found and sent to room. In room had mild hyprreflexia, but no other acute findgins, and code stroke called.

## 2018-08-25 NOTE — ED ADULT NURSE REASSESSMENT NOTE - NS ED NURSE REASSESS COMMENT FT1
report given to CDU Antonia ERNST going to room 7. Pt has no complaints. Pt being wheeled to CDU by Charbel MELTON with wife.

## 2018-08-25 NOTE — CONSULT NOTE ADULT - ASSESSMENT
A/P   77 y/o M nonsmoker PMHx undiagnosed HTN, cataract surgery p/w difficulty coordinating fingers that occurred around 7am on 7/25, now back to his baseline.   Otherwise no focal deficits noted except for mild sensory deficit to light touch in dorsal surface of left foot.   CT/CTA without acute findings. NIHSS =1, MRS =0  MRI brain shows bilateral embolic strokes.    - Telemonitoring, r/o Afib  - MRI Brain w/o contrast  - aspirin 81 daily  - plavix 75mg (to continue x 3 weeks)   - Lipid panel, HgA1c, b12, folate  - Kindly inform us if pt's condition/situation changes    Thank you for involving us in the care of this patient.

## 2018-08-25 NOTE — ED CDU PROVIDER INITIAL DAY NOTE - ATTENDING CONTRIBUTION TO CARE
Patient is a 77 yo M here for further evaluation of questionable TIA. Patient reportedly developed difficulty typing at his usual speed. Denies chest pain, sob, headache, weakness, fevers, chills. Patient was seen by neurology, had a CTA of his head and neck done with no acute findings. He is in CDU for MRI head, neuro checks and tele monitoring. Patient is a 79 yo M here for further evaluation of questionable TIA. Patient reportedly states he couldn't move his fingers to touch the keys on the keyboard. Episode lasted about 15 min. He is differentiating this from strange sensation in both of his shoulders which is unable to further describe, but states this lasted a few hours. He states he has no symptoms at the time of my interview. Denies chest pain, sob, headache, weakness, fevers, chills. Patient was seen by neurology, had a CTA of his head and neck done with no acute findings. He is in CDU for MRI head, neuro checks and tele monitoring.    VS noted  Gen. no acute distress, Non toxic   HEENT: EOMI, PERRL, mmm  Lungs: CTAB/L no C/ W /R   CVS: RRR   Abd; Soft non tender, non distended   Ext: no edema  Skin: no rash  Neuro AAOx3, CN 3-12 intact, strength 5/5 in UE/LE, gait normal, clear speech  a/p: ? TIA, patient evaluated by neuro, no acute findings on CTA Head and Neck. Pending MRI Head and neuro recs.   - Sherri YODER

## 2018-08-25 NOTE — H&P ADULT - PROBLEM SELECTOR PLAN 4
- patient takes several vitamin and herbal supplements, but unable to recall all  - very conscious about diet also; pesco vegetarian  - vitamins include MVI daily, Vit D 10,000 IU daily during summer months, and 12,000 IU daily during winter months, Vit C 6 grams daily, Vit K, Vit B12, Vit-B complex, Boswellia alexis  - concern for potential side effects, including liver and kidney  - consider nutrition consult to discuss any potential side effects  - no vitamin supplements prescribed at present

## 2018-08-25 NOTE — ED PROVIDER NOTE - CRANIAL NERVE AND PUPILLARY EXAM
cranial nerves 2-12 intact extra-ocular movements intact/cranial nerves 2-12 intact/central and peripheral vision intact/tongue is midline

## 2018-08-25 NOTE — ED PROVIDER NOTE - PROGRESS NOTE DETAILS
No tpa, per neuro.  To CDU for MRI and further eval. SILVIO CHRISTINE:  Discussed case with Neurology Dr. Hardik Mon who recommends MR head w/o contrast.

## 2018-08-25 NOTE — H&P ADULT - ASSESSMENT
78 year old male, with past history significant for Cataract surgery, presented to the ED secondary to sudden onset of difficulty coordinating the fingers.  Vital signs upon ED presentation as follows: BP = 208/108, Hr = 76, RR = 18, T = 36.1 C (97 F), O2 Sat = 100% on RA.  Diagnosed with Cerebrovascular Accident.  Admitted for further management of:

## 2018-08-25 NOTE — ED PROVIDER NOTE - ATTENDING CONTRIBUTION TO CARE
ED Attending (Dr Chong): I have personally performed a face to face bedside history and physical examination of this patient.  I have discussed the case with the PA and  I have personally authored the following components: HPI, ROS, Physical Exam and MDM in addition to reviewing and revising the rest of the Provider Note. Pt is a 79 y/o M nonsmoker PMHx cataract surgery p/w difficulty coordinating fingers x 3.5 hrs -- difficulty coordinating without objective neuro deficits -- labs, code stroke, Bp contro.

## 2018-08-25 NOTE — H&P ADULT - HISTORY OF PRESENT ILLNESS
78 year old male, with past history significant for Cataract surgery, presented to the ED secondary to difficulty coordinating the fingers.  Seen and evaluated at bedside;    Vital signs upon ED presentation as follows: BP = 208/108, Hr = 76, RR = 18, T = 36.1 C (97 F), O2 Sat = 100% on RA.  Diagnosed with Cerebrovascular Accident.  Prescribed labetalol 10 mg IV x one in the ED. 78 year old male, with past history significant for Cataract surgery, presented to the ED secondary to difficulty coordinating the fingers.  Seen and evaluated at bedside; NAD.  Patient relates that while sitting at his computer typing, he had difficulty pressing a key while using the right hand.  Eventually pressed the key, but his finger movements were not long as fluid as at baseline.  Simultaneously, patient experienced a strange, unfamiliar and indescribable sensation at the nape of the neck and shoulder.  No headaches, blurred vision, lightheadedness, dizziness, palpitations, chest pain or any other associated signs or symptoms.  Immediately, patient concluded that he was suffering a stroke and decided to come to the ED for evaluation.  Per documentation, after assessment in the ED, patient was brought to a room and later demonstrated mild hyperreflexia and a Stroke Code was effectuated.  CT scan of the head was negative for any acute pathology.  Evaluated by Neurology team and recommendations made - including for telemetry monitoring, MRI of the brain, aspirin, plavix...    Additional information includes history of labile blood pressure.  Normal range is  to 130 and DBP 60 to 8 to 70.  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  However, BP has been stable since long term.  Today's elevation was driven by the presumed knowledge of experiencing a stroke, per patient.  Patient is very active; runs every morning in the park, has participated in 3 marathons thus far.  Maintains a healthy pesco-vegetarian, complemented by significant amounts of vitamins and herbal supplements.  No toxic habits.    Vital signs upon ED presentation as follows: BP = 208/108, Hr = 76, RR = 18, T = 36.1 C (97 F), O2 Sat = 100% on RA.  Diagnosed with Cerebrovascular Accident.  Prescribed labetalol 10 mg IV x one in the ED. 78 year old male, with past history significant for Cataract surgery, presented to the ED secondary to sudden onset of difficulty coordinating the fingers.  Seen and evaluated at bedside; NAD.  Patient relates that while sitting at his computer typing, he had difficulty pressing a key while using the right hand.  Eventually pressed the key, but his finger movements were not long as fluid as at baseline.  Simultaneously, patient experienced a strange, unfamiliar and indescribable sensation at the nape of the neck and shoulder.  No headaches, blurred vision, lightheadedness, dizziness, palpitations, chest pain or any other associated signs or symptoms.  Immediately, patient concluded that he was suffering a stroke and decided to come to the ED for evaluation.  Per documentation, after assessment in the ED, patient was brought to a room and later demonstrated mild hyperreflexia and a Stroke Code was effectuated.  CT scan of the head was negative for any acute pathology.  Evaluated by Neurology team and recommendations made - including for telemetry monitoring, MRI of the brain, aspirin, plavix...    Additional information includes history of labile blood pressure.  Normal range is  to 130 and DBP 60 to 8 to 70.  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  However, BP has been stable since snf.  Today's elevation was driven by the presumed knowledge of experiencing a stroke, per patient.  Patient is very active; runs every morning in the park, has participated in 3 marathons thus far.  Maintains a healthy pesco-vegetarian, complemented by significant amounts of vitamins and herbal supplements.  No toxic habits.    Vital signs upon ED presentation as follows: BP = 208/108, Hr = 76, RR = 18, T = 36.1 C (97 F), O2 Sat = 100% on RA.  Diagnosed with Cerebrovascular Accident.  Prescribed labetalol 10 mg IV x one in the ED.

## 2018-08-26 DIAGNOSIS — I63.40 CEREBRAL INFARCTION DUE TO EMBOLISM OF UNSPECIFIED CEREBRAL ARTERY: ICD-10-CM

## 2018-08-26 DIAGNOSIS — Z00.8 ENCOUNTER FOR OTHER GENERAL EXAMINATION: ICD-10-CM

## 2018-08-26 LAB
24R-OH-CALCIDIOL SERPL-MCNC: 71.3 NG/ML — SIGNIFICANT CHANGE UP (ref 30–80)
BUN SERPL-MCNC: 20 MG/DL — SIGNIFICANT CHANGE UP (ref 7–23)
CALCIUM SERPL-MCNC: 9.3 MG/DL — SIGNIFICANT CHANGE UP (ref 8.4–10.5)
CHLORIDE SERPL-SCNC: 104 MMOL/L — SIGNIFICANT CHANGE UP (ref 98–107)
CO2 SERPL-SCNC: 28 MMOL/L — SIGNIFICANT CHANGE UP (ref 22–31)
CREAT SERPL-MCNC: 0.84 MG/DL — SIGNIFICANT CHANGE UP (ref 0.5–1.3)
GLUCOSE SERPL-MCNC: 92 MG/DL — SIGNIFICANT CHANGE UP (ref 70–99)
MAGNESIUM SERPL-MCNC: 2.3 MG/DL — SIGNIFICANT CHANGE UP (ref 1.6–2.6)
PHOSPHATE SERPL-MCNC: 4.5 MG/DL — SIGNIFICANT CHANGE UP (ref 2.5–4.5)
POTASSIUM SERPL-MCNC: 3.9 MMOL/L — SIGNIFICANT CHANGE UP (ref 3.5–5.3)
POTASSIUM SERPL-SCNC: 3.9 MMOL/L — SIGNIFICANT CHANGE UP (ref 3.5–5.3)
SODIUM SERPL-SCNC: 145 MMOL/L — SIGNIFICANT CHANGE UP (ref 135–145)
TSH SERPL-MCNC: 4.6 UIU/ML — HIGH (ref 0.27–4.2)

## 2018-08-26 PROCEDURE — 99223 1ST HOSP IP/OBS HIGH 75: CPT

## 2018-08-26 PROCEDURE — 99233 SBSQ HOSP IP/OBS HIGH 50: CPT

## 2018-08-26 NOTE — OCCUPATIONAL THERAPY INITIAL EVALUATION ADULT - MD ORDER
Occupational Therapy (OT) to evaluate and treat. Occupational Therapy (OT) to evaluate and treat. Ambulate as Tolerated. Out of bed to Chair. Per SUJATA Moura, pt is okay to participate in OT evaluation and perform activity as tolerated.

## 2018-08-26 NOTE — PHYSICAL THERAPY INITIAL EVALUATION ADULT - GENERAL OBSERVATIONS, REHAB EVAL
Pt encountered in semisupine position, no distress, Axox4, with +IV, +cardiac monitor, and wife @bedside.

## 2018-08-26 NOTE — OCCUPATIONAL THERAPY INITIAL EVALUATION ADULT - LEVEL OF INDEPENDENCE: SIT/SUPINE, REHAB EVAL
Not assessed. Pt left sitting at edge of bed. No acute distress. Call bell in reach. All lines intact and precautions maintained. RN, made aware. Wife present bedside.

## 2018-08-26 NOTE — PROGRESS NOTE ADULT - ATTENDING COMMENTS
Extensive discussion with patient  Cards eval pending for loop recorder  Refusing meds, will get CT coronaries for calcium score  F/u Echo and neuro recs

## 2018-08-26 NOTE — PROGRESS NOTE ADULT - SUBJECTIVE AND OBJECTIVE BOX
Chief Complaint: Patient is a 78y old  Male who presents with a chief complaint of Cerebrovascular Accident (25 Aug 2018 21:01)      INTERVAL HPI/OVERNIGHT EVENTS: Patient refusing medications. Feels much better.        MEDICATIONS  (STANDING):  aspirin enteric coated 81 milliGRAM(s) Oral daily  atorvastatin 80 milliGRAM(s) Oral at bedtime  clopidogrel Tablet 75 milliGRAM(s) Oral daily    MEDICATIONS  (PRN):      Vital Signs Last 24 Hrs  T(C): 37.3 (26 Aug 2018 14:45), Max: 37.6 (26 Aug 2018 02:00)  T(F): 99.2 (26 Aug 2018 14:45), Max: 99.6 (26 Aug 2018 02:00)  HR: 60 (26 Aug 2018 14:45) (55 - 61)  BP: 142/78 (26 Aug 2018 14:45) (139/56 - 169/83)  BP(mean): --  RR: 18 (26 Aug 2018 14:45) (16 - 18)  SpO2: 100% (26 Aug 2018 14:45) (95% - 100%)      I&O's Detail    CAPILLARY BLOOD GLUCOSE          PHYSICAL EXAM:    GENERAL: NAD  Pulm: CTA b/l  CV: S1&S2+, rrr  ABDOMEN: bs+, soft, nt, nd,   EXTREMITIES:  2+ peripheral pulses, no appreciable edema in b/l LE  Neuro: Awake, alert      LABS:                        15.6   8.72  )-----------( 197      ( 25 Aug 2018 10:49 )             45.5     08-25    145  |  104  |  20  ----------------------------<  92  3.9   |  28  |  0.84    Ca    9.3      25 Aug 2018 23:50  Phos  4.5     08-25  Mg     2.3     08-25    TPro  7.5  /  Alb  4.2  /  TBili  0.5  /  DBili  x   /  AST  50<H>  /  ALT  29  /  AlkPhos  71  08-25    LIVER FUNCTIONS - ( 25 Aug 2018 10:49 )  Alb: 4.2 g/dL / Pro: 7.5 g/dL / ALK PHOS: 71 u/L / ALT: 29 u/L / AST: 50 u/L / GGT: x     / T. Bili 0.5 mg/dL / D. Bili x         PT/INR - ( 25 Aug 2018 10:49 )   PT: 10.8 SEC;   INR: 0.94          PTT - ( 25 Aug 2018 10:49 )  PTT:29.5 SEC            Microbiology:    RADIOLOGY & ADDITIONAL TESTS:  < from: MR Head No Cont (08.25.18 @ 15:54) >  IMPRESSION: Scattered infarcts in the right occipital lobe, left centrum   semiovale ovale and bridgette suspicious for multiple emboli. No acute   hemorrhage.Atrophy and small vessel white matter ischemic changes.    Findings stress with Dr. Mon by Dr. Weiss at  time of dictation with   read back.    < end of copied text >      Imaging Personally Reviewed:     Consultant(s) Notes Reviewed:  Neurology    Care Discussed with Consultants/Other Providers

## 2018-08-26 NOTE — OCCUPATIONAL THERAPY INITIAL EVALUATION ADULT - GENERAL OBSERVATIONS, REHAB EVAL
Pt. received semisupine in bed. No acute distress. Patient agreed to evaluation from Occupational Therapist. +Heplock, +Tele. Wife present bedside.

## 2018-08-26 NOTE — PROGRESS NOTE ADULT - PROBLEM SELECTOR PLAN 3
- ECG w/ NSR at 73 bpm, LAD, incomplete LBBB, small U-waves  - no reports of palpitations, chest pain, shortness of breath  - findings may be chronic in patient who is very active (ran 3 marathons and works out constantly)  - does not have a PMD  - cardiology consult already indicated for Loop recorder  -will obtain Ct coronaries for calcium score

## 2018-08-26 NOTE — OCCUPATIONAL THERAPY INITIAL EVALUATION ADULT - PERTINENT HX OF CURRENT PROBLEM, REHAB EVAL
Pt is a 78 year old male with hx of cataract surgery, who presented to [facility] on 8/25/18 with sudden onset of difficulty coordinating the fingers. CT Brain Scan on 8/25/18 displayed no acute intracranial hemorrhage, mass effect or midline shift. Pt is a 78 year old male with hx of cataract surgery, who presented to [facility] on 8/25/18 with sudden onset of difficulty coordinating the fingers. MR Head No Contrast on 8/25/18 displayed scattered infarcts in the right occipital lobe, left centrum semiovale ovale and bridgette suspicious for multiple emboli. No acute hemorrhage. Atrophy and small vessel white matter ischemic changes.

## 2018-08-26 NOTE — PHYSICAL THERAPY INITIAL EVALUATION ADULT - PERTINENT HX OF CURRENT PROBLEM, REHAB EVAL
Pt is a 78 year old male with hx of cataract surgery, who presented to [facility] on 8/25/18 with sudden onset of difficulty coordinating the fingers. CT Brain Scan on 8/25/18 displayed no acute intracranial hemorrhage, mass effect or midline shift.

## 2018-08-26 NOTE — PHYSICAL THERAPY INITIAL EVALUATION ADULT - ACTIVE RANGE OF MOTION EXAMINATION, REHAB EVAL
galina. upper extremity Active ROM was WNL (within normal limits)/bilateral lower extremity Active ROM was WNL (within normal limits)

## 2018-08-26 NOTE — PHYSICAL THERAPY INITIAL EVALUATION ADULT - ADDITIONAL COMMENTS
Pt reports that he lives in a private house with wife with 2STE; no handrails; and a flight of stairs to negotiate to bedroom; (+)1 handrail. Prior to hospital admission pt was completely independent and used no assistive device with ambulation. Pt denies any recent falls; however did injure his left meniscus in June of 2018.    Pt left comfortable seated @EOB, NAD, all lines intact, all precautions maintained, with call bell in reach, wife @bedside, and RN aware of PT evaluation.

## 2018-08-26 NOTE — PHYSICAL THERAPY INITIAL EVALUATION ADULT - DIAGNOSIS, PT EVAL
Pt admitted for (+)CVA; MRI (+) Scattered infarcts in the right occipital lobe, left centrum semiovale ovale and bridgette suspicious for multiple emboli; pt presents with decreased coordination in UE. Pt admitted for (+)CVA; MRI (+) Scattered infarcts in the right occipital lobe, left centrum semiovale ovale and bridgette suspicious for multiple emboli; pt presents with decreased coordination in UE (Left>Right).

## 2018-08-26 NOTE — OCCUPATIONAL THERAPY INITIAL EVALUATION ADULT - LIVES WITH, PROFILE
Pt. reports he lives with his wife in a house with 2 steps to enter. Once inside, pt. reports he has a full flight of steps to negotiate to reach 2nd floor where main bedroom and bathroom are located. Per pt., he has a shower stall in his bathroom.

## 2018-08-26 NOTE — PHYSICAL THERAPY INITIAL EVALUATION ADULT - PATIENT PROFILE REVIEW, REHAB EVAL
yes ACTIVITY: Ambulate as Tolerated/OOB to Chair; spoke with RN Ele Moura prior to PT evaluation--> Pt OK for PT consult/OOB activity/yes

## 2018-08-26 NOTE — PROGRESS NOTE ADULT - PROBLEM SELECTOR PLAN 1
- refusing asa, statin, and plavix  -believes asa and plavix risk of bleeding outweigh benefits  -believes statin helpful in patients with coronary artery disease, wants calcium score to see if high risk for coronary artery event or stroke  -attributes CVA to hypertensive emergency and not does believe antiplatelet and statin therapy will benefit him  (Note statin beneficial in patients with or without coronary artery disease. Stroke is a CAD equivalent)  - BP = 208/108 on admission (HR = 76)  - MRI with findings of multiple foci of emboli w/o hemorrhage  - neuro recs appreciated  - recommended for plavix, aspirin, simvastatin by Neuro; patient refuses all  - patient takes multiple high dose supplements (see below), and believes he does not need the prescribed meds; thinks that his lipid panel differential is at target for age  - f/u blood cultures, TTE (as recommended by Neuro)  - awaiting cardiology eval for Loop recorder (as recommended by Neuro)  - neurological checks  - telemetry monitoring  - passed swallow eval  -  PT/OT eval-home PT

## 2018-08-27 LAB
BUN SERPL-MCNC: 17 MG/DL — SIGNIFICANT CHANGE UP (ref 7–23)
CALCIUM SERPL-MCNC: 9.1 MG/DL — SIGNIFICANT CHANGE UP (ref 8.4–10.5)
CHLORIDE SERPL-SCNC: 107 MMOL/L — SIGNIFICANT CHANGE UP (ref 98–107)
CO2 SERPL-SCNC: 26 MMOL/L — SIGNIFICANT CHANGE UP (ref 22–31)
CREAT SERPL-MCNC: 0.89 MG/DL — SIGNIFICANT CHANGE UP (ref 0.5–1.3)
GLUCOSE SERPL-MCNC: 94 MG/DL — SIGNIFICANT CHANGE UP (ref 70–99)
HCT VFR BLD CALC: 42.2 % — SIGNIFICANT CHANGE UP (ref 39–50)
HGB BLD-MCNC: 14.2 G/DL — SIGNIFICANT CHANGE UP (ref 13–17)
MCHC RBC-ENTMCNC: 32.6 PG — SIGNIFICANT CHANGE UP (ref 27–34)
MCHC RBC-ENTMCNC: 33.6 % — SIGNIFICANT CHANGE UP (ref 32–36)
MCV RBC AUTO: 96.8 FL — SIGNIFICANT CHANGE UP (ref 80–100)
NRBC # FLD: 0 — SIGNIFICANT CHANGE UP
PLATELET # BLD AUTO: 190 K/UL — SIGNIFICANT CHANGE UP (ref 150–400)
PMV BLD: 11.2 FL — SIGNIFICANT CHANGE UP (ref 7–13)
POTASSIUM SERPL-MCNC: 4.1 MMOL/L — SIGNIFICANT CHANGE UP (ref 3.5–5.3)
POTASSIUM SERPL-SCNC: 4.1 MMOL/L — SIGNIFICANT CHANGE UP (ref 3.5–5.3)
RBC # BLD: 4.36 M/UL — SIGNIFICANT CHANGE UP (ref 4.2–5.8)
RBC # FLD: 12 % — SIGNIFICANT CHANGE UP (ref 10.3–14.5)
SODIUM SERPL-SCNC: 144 MMOL/L — SIGNIFICANT CHANGE UP (ref 135–145)
SPECIMEN SOURCE: SIGNIFICANT CHANGE UP
SPECIMEN SOURCE: SIGNIFICANT CHANGE UP
WBC # BLD: 7.51 K/UL — SIGNIFICANT CHANGE UP (ref 3.8–10.5)
WBC # FLD AUTO: 7.51 K/UL — SIGNIFICANT CHANGE UP (ref 3.8–10.5)

## 2018-08-27 PROCEDURE — 93306 TTE W/DOPPLER COMPLETE: CPT | Mod: 26

## 2018-08-27 PROCEDURE — 99233 SBSQ HOSP IP/OBS HIGH 50: CPT

## 2018-08-27 PROCEDURE — 99233 SBSQ HOSP IP/OBS HIGH 50: CPT | Mod: GC

## 2018-08-27 RX ORDER — DOCUSATE SODIUM 100 MG
100 CAPSULE ORAL
Qty: 0 | Refills: 0 | Status: DISCONTINUED | OUTPATIENT
Start: 2018-08-27 | End: 2018-08-29

## 2018-08-27 RX ORDER — ENOXAPARIN SODIUM 100 MG/ML
40 INJECTION SUBCUTANEOUS DAILY
Qty: 0 | Refills: 0 | Status: DISCONTINUED | OUTPATIENT
Start: 2018-08-27 | End: 2018-08-29

## 2018-08-27 RX ORDER — CLOPIDOGREL BISULFATE 75 MG/1
225 TABLET, FILM COATED ORAL ONCE
Qty: 0 | Refills: 0 | Status: COMPLETED | OUTPATIENT
Start: 2018-08-27 | End: 2018-08-27

## 2018-08-27 RX ADMIN — CLOPIDOGREL BISULFATE 225 MILLIGRAM(S): 75 TABLET, FILM COATED ORAL at 14:59

## 2018-08-27 RX ADMIN — ENOXAPARIN SODIUM 40 MILLIGRAM(S): 100 INJECTION SUBCUTANEOUS at 17:42

## 2018-08-27 RX ADMIN — CLOPIDOGREL BISULFATE 75 MILLIGRAM(S): 75 TABLET, FILM COATED ORAL at 13:11

## 2018-08-27 RX ADMIN — Medication 81 MILLIGRAM(S): at 13:11

## 2018-08-27 NOTE — PROGRESS NOTE ADULT - PROBLEM SELECTOR PLAN 3
- ECG w/ NSR at 73 bpm, LAD, incomplete LBBB, small U-waves  - no reports of palpitations, chest pain, shortness of breath  - findings may be chronic in patient who is very active (ran 3 marathons and works out constantly)  - does not have a PMD  -will obtain Ct coronaries for calcium score

## 2018-08-27 NOTE — PROGRESS NOTE ADULT - SUBJECTIVE AND OBJECTIVE BOX
Patient is a 78y old  Male who presents with a chief complaint of Cerebrovascular Accident (25 Aug 2018 21:01)      SUBJECTIVE / OVERNIGHT EVENTS: Pt in NAD states will not take any meds unless he does a through research on them for weeks. Tele Sinus debi    MEDICATIONS  (STANDING):  aspirin enteric coated 81 milliGRAM(s) Oral daily  atorvastatin 80 milliGRAM(s) Oral at bedtime  clopidogrel Tablet 75 milliGRAM(s) Oral daily  docusate sodium 100 milliGRAM(s) Oral two times a day    MEDICATIONS  (PRN):        CAPILLARY BLOOD GLUCOSE        I&O's Summary      T(C): 36.7 (08-27-18 @ 06:37), Max: 37.3 (08-26-18 @ 14:45)  HR: 58 (08-27-18 @ 06:37) (58 - 60)  BP: 156/78 (08-27-18 @ 06:37) (142/78 - 156/78)  RR: 18 (08-27-18 @ 06:37) (18 - 18)  SpO2: 97% (08-27-18 @ 06:37) (97% - 100%)    PHYSICAL EXAM:  GENERAL: NAD, well-developed  HEAD:  Atraumatic, Normocephalic  EYES: EOMI, PERRLA, conjunctiva and sclera clear  NECK: Supple, No JVD  CHEST/LUNG: Clear to auscultation bilaterally; No wheeze  HEART: Regular rate and rhythm; No murmurs, rubs, or gallops  ABDOMEN: Soft, Nontender, Nondistended; Bowel sounds present  EXTREMITIES:  2+ Peripheral Pulses, No clubbing, cyanosis, or edema  PSYCH: AAOx3      LABS:                        14.2   7.51  )-----------( 190      ( 27 Aug 2018 06:35 )             42.2     08-27    144  |  107  |  17  ----------------------------<  94  4.1   |  26  |  0.89    Ca    9.1      27 Aug 2018 06:35  Phos  4.5     08-25  Mg     2.3     08-25                  RADIOLOGY & ADDITIONAL TESTS:    Imaging Personally Reviewed:< from: CT Angio Neck w/ IV Cont (08.25.18 @ 10:56) >  INTERPRETATION:  CLINICAL INFORMATION: Difficulty with coordination.     TECHNIQUE: Noncontrast axial CT images were acquired from the skull base   through the vertex. Two-dimensional sagittal and coronal reformats were   generated. Contrast enhanced axial CT images were acquired from the   aortic arch to the vertex of the calvarium during the angiographic phase.   90cc Omnipaque 350 were administered intravenously and 10cc were   discarded. Three-dimensional maximum intensity projection reformats were   generated.     COMPARISON: None.    FINDINGS:     CT ANGIOGRAPHY NECK:    There is no evidence for significant stenosis or major vessel occlusion   involving the bilateral carotid arteries.    There is no evidence for significant stenosis or major vessel occlusion   involving the bilateral vertebral arteries.     There is a subcentimeter hypoattenuating nodule in the right thyroid   lobe. There is a small mucous retention cyst versus polyp of the left   frontal sinus. The remaining imaged soft tissues are within normal   limits. The lung apices are clear.    There is straightening of the normal cervical lordosis which is likely   secondary to neck muscle spasm and/or positioning. There are multilevel   degenerative changes of the cervical spine.    CT ANGIOGRAPHY BRAIN:    There is good opacification of the intracranial internal carotid arties,   basilar artery and [place]. A complete [place] is   demonstrated.     A diminutive left A1 segment is faintly opacified. There is no evidence   for major vessel occlusion or aneurysm of the anterior circulation   including the intracranial internal carotid arteries, anterior cerebral   arteries, anterior communicating artery, and middle cerebral arteries.   There is mild atherosclerotic disease involving the right carotid siphon   without significant stenosis.     Dominant left vertebral artery is demonstrated. There is no evidence for   major vessel occlusion, or aneurysm involving the posterior circulation   including the intracranial vertebral arteries, basilar artery, posterior   cerebral arteries, visualized posterior communicating arteries, and   visualized cerebellar arteries. There is mild episcleritis disease   involving the bilateral intracranial vertebral arteries without stenosis.    No enlarged vascular lesions or clusters of abnormal vessels are noted to   suggest an arterial venous malformation within the field-of-view.    The visualized portions of the superficial and deep venous systems are   unremarkable.      IMPRESSION:     CTA NECK: No evidence of hemodynamically significant stenosis using   NASCET criteria. No evidence of arterial dissection.    CTA BRAIN: No major vessel occlusion or proximal stenosis.  Normal   anatomic variants as discussed in the body the report.    < end of copied text >      Consultant(s) Notes Reviewed:      Care Discussed with Consultants/Other Providers:

## 2018-08-27 NOTE — PROGRESS NOTE ADULT - PROBLEM SELECTOR PLAN 1
-embolic CVA  - refusing asa, statin, and plavix  -believes asa and plavix risk of bleeding outweigh benefits  -believes statin helpful in patients with coronary artery disease, wants calcium score to see if high risk for coronary artery event or stroke  -attributes CVA to hypertensive emergency and not does believe antiplatelet and statin therapy will benefit him (Note statin beneficial in patients with or without coronary artery disease. Stroke is a CAD equivalent)  - BP = 208/108 on admission (HR = 76)  - MRI with findings of multiple foci of emboli w/o hemorrhage  - neuro recs appreciated  - recommended for plavix, aspirin, simvastatin by Neuro; patient refuses all  - patient takes multiple high dose supplements (see below), and believes he does not need the prescribed meds; thinks that his lipid panel differential is at target for age  - TTE w/ bubble   - EP consult for Loop  - neurological checks  - telemetry monitoring  - passed swallow eval  -  PT/OT eval-home OT  - refusing heparin sq explained to pt its standard of care for CVA

## 2018-08-27 NOTE — CONSULT NOTE ADULT - SUBJECTIVE AND OBJECTIVE BOX
Patient is a 78y old male with history of cataracts s/p surgical excision with implants who presented with decreased dexterity when using his hand. This was associated with a strange sensation in the nape of his neck.  He came to the ED for evaluation and though the head CT was negative an MRI noted multiple foci of emboli.  There is no EKG or telemetry evidence of atrial fibrillation.  He denies palpitations, near syncope or syncope.  No history of shortness of breath, dizziness or chest discomfort.  We are asked to evaluate him for an ILR.    PAST MEDICAL & SURGICAL HISTORY:  Meniscal injury: ~ left (no surgery)  S/P cataract surgery      MEDICATIONS  (STANDING):  aspirin enteric coated 81 milliGRAM(s) Oral daily  atorvastatin 80 milliGRAM(s) Oral at bedtime  clopidogrel Tablet 75 milliGRAM(s) Oral daily  docusate sodium 100 milliGRAM(s) Oral two times a day  enoxaparin Injectable 40 milliGRAM(s) SubCutaneous daily    MEDICATIONS  (PRN):      FAMILY HISTORY:  Family history of heart disease (Father): father (sudden death at age 56 years; heart disease presumed to be due to Rheumatic heart disease  Family history of mental disorder (Mother): mother ( at age 91 years)      SOCIAL HISTORY:    CIGARETTES:  never    ALCOHOL:  no     REVIEW OF SYSTEMS:    CONSTITUTIONAL: No fever, weight loss, chills, shakes, or fatigue  EYES: No eye pain, visual disturbances, or discharge  ENMT:  No difficulty hearing, tinnitus, vertigo; No sinus or throat pain  NECK: No pain or stiffness  BREASTS: No pain, masses, or nipple discharge  RESPIRATORY: No cough, wheezing, hemoptysis, or shortness of breath  CARDIOVASCULAR: No chest pain, dyspnea, palpitations, dizziness, syncope, paroxysmal nocturnal dyspnea, orthopnea, or arm or leg swelling  GASTROINTESTINAL: No abdominal  or epigastric pain, nausea, vomiting, hematemesis, diarrhea, constipation, melena or bright red blood.  GENITOURINARY: No dysuria, nocturia, hematuria, or urinary incontinence  NEUROLOGICAL: No headaches, memory loss, slurred speech, limb weakness, loss of strength, numbness, or tremors  SKIN: No itching, burning, rashes, or lesions   LYMPH NODES: No enlarged glands  ENDOCRINE: No heat or cold intolerance, or hair loss  MUSCULOSKELETAL: No joint pain or swelling, muscle, back, or extremity pain  PSYCHIATRIC: No depression, anxiety, or difficulty sleeping  HEME/LYMPH: No easy bruising or bleeding gums  ALLERY AND IMMUNOLOGIC: No hives or rash.      Vital Signs Last 24 Hrs  T(C): 36.9 (27 Aug 2018 14:43), Max: 37.1 (26 Aug 2018 20:09)  T(F): 98.4 (27 Aug 2018 14:43), Max: 98.8 (26 Aug 2018 20:09)  HR: 61 (27 Aug 2018 14:43) (58 - 61)  BP: 149/85 (27 Aug 2018 14:43) (144/78 - 156/78)  BP(mean): --  RR: 18 (27 Aug 2018 14:43) (18 - 18)  SpO2: 95% (27 Aug 2018 14:43) (95% - 100%)    PHYSICAL EXAM:    GENERAL: In no apparent distress, well nourished, and hydrated.  HEAD:  Atraumatic, Normocephalic  EYES: EOMI, PERRLA, conjunctiva and sclera clear  ENMT: No tonsillar erythema, exudates, or enlargement; Moist mucous membranes, Good dentition, No lesions  NECK: Supple and normal thyroid.  No JVD or carotid bruit.  Carotid pulse is 2+ bilaterally.  HEART: Regular rate and rhythm; No murmurs, rubs, or gallops.  PULMONARY: Clear to auscultation and perfusion.  No rales, wheezing, or rhonchi bilaterally.  ABDOMEN: Soft, Nontender, Nondistended; Bowel sounds present  EXTREMITIES:  2+ Peripheral Pulses, No clubbing, cyanosis, or edema  LYMPH: No lymphadenopathy noted  NEUROLOGICAL: Grossly nonfocal          INTERPRETATION OF TELEMETRY:    ECG:    I&O's Detail    27 Aug 2018 07:01  -  27 Aug 2018 19:09  --------------------------------------------------------  IN:  Total IN: 0 mL    OUT:    Voided: 600 mL  Total OUT: 600 mL    Total NET: -600 mL          LABS:                        14.2   7.51  )-----------( 190      ( 27 Aug 2018 06:35 )             42.2         144  |  107  |  17  ----------------------------<  94  4.1   |  26  |  0.89    Ca    9.1      27 Aug 2018 06:35  Phos  4.5     08-  Mg     2.3     08-              BNP  I&O's Detail    27 Aug 2018 07:01  -  27 Aug 2018 19:09  --------------------------------------------------------  IN:  Total IN: 0 mL    OUT:    Voided: 600 mL  Total OUT: 600 mL    Total NET: -600 mL        Daily     Daily     RADIOLOGY & ADDITIONAL STUDIES:  PREVIOUS DIAGNOSTIC TESTING:      ECHO  FINDINGS:    STRESS  FINDINGS:    CATHETERIZATION  FINDINGS:      ASSESSMENT:        RECOMMENDATIONS: Patient is a 78y old male with history of cataracts s/p surgical excision with implants who presented with decreased dexterity when using his hand. This was associated with a strange sensation in the nape of his neck.  He came to the ED for evaluation and though the head CT was negative an MRI noted multiple foci of emboli.  There is no EKG or telemetry evidence of atrial fibrillation.  He denies palpitations, near syncope or syncope.  No history of shortness of breath, dizziness or chest discomfort.  We are asked to evaluate him for an ILR.    PAST MEDICAL & SURGICAL HISTORY:  Meniscal injury: ~ left (no surgery)  S/P cataract surgery      MEDICATIONS  (STANDING):  aspirin enteric coated 81 milliGRAM(s) Oral daily  atorvastatin 80 milliGRAM(s) Oral at bedtime  clopidogrel Tablet 75 milliGRAM(s) Oral daily  docusate sodium 100 milliGRAM(s) Oral two times a day  enoxaparin Injectable 40 milliGRAM(s) SubCutaneous daily    MEDICATIONS  (PRN):      FAMILY HISTORY:  Family history of heart disease (Father): father (sudden death at age 56 years; heart disease presumed to be due to Rheumatic heart disease  Family history of mental disorder (Mother): mother ( at age 91 years)      SOCIAL HISTORY:    CIGARETTES:  never    ALCOHOL:  no     REVIEW OF SYSTEMS:    CONSTITUTIONAL: No fever, weight loss, chills, shakes, or fatigue  EYES: No eye pain, visual disturbances, or discharge  ENMT:  No difficulty hearing, tinnitus, vertigo; No sinus or throat pain  NECK: No pain or stiffness  BREASTS: No pain, masses, or nipple discharge  RESPIRATORY: No cough, wheezing, hemoptysis, or shortness of breath  CARDIOVASCULAR: No chest pain, dyspnea, palpitations, dizziness, syncope, paroxysmal nocturnal dyspnea, orthopnea, or arm or leg swelling  GASTROINTESTINAL: No abdominal  or epigastric pain, nausea, vomiting, hematemesis, diarrhea, constipation, melena or bright red blood.  GENITOURINARY: No dysuria, nocturia, hematuria, or urinary incontinence  NEUROLOGICAL: No headaches, memory loss, slurred speech, limb weakness, loss of strength, numbness, or tremors  + decreased dexterity with right hand  SKIN: No itching, burning, rashes, or lesions   LYMPH NODES: No enlarged glands  ENDOCRINE: No heat or cold intolerance, or hair loss  MUSCULOSKELETAL: No joint pain or swelling, muscle, back, or extremity pain  PSYCHIATRIC: No depression, anxiety, or difficulty sleeping  HEME/LYMPH: No easy bruising or bleeding gums  ALLERY AND IMMUNOLOGIC: No hives or rash.      Vital Signs Last 24 Hrs  T(C): 36.9 (27 Aug 2018 14:43), Max: 37.1 (26 Aug 2018 20:09)  T(F): 98.4 (27 Aug 2018 14:43), Max: 98.8 (26 Aug 2018 20:09)  HR: 61 (27 Aug 2018 14:43) (58 - 61)  BP: 149/85 (27 Aug 2018 14:43) (144/78 - 156/78)  BP(mean): --  RR: 18 (27 Aug 2018 14:43) (18 - 18)  SpO2: 95% (27 Aug 2018 14:43) (95% - 100%)    PHYSICAL EXAM:    GENERAL: In no apparent distress, well nourished, and hydrated.  HEAD:  Atraumatic, Normocephalic  EYES: EOMI, PERRLA, conjunctiva and sclera clear  ENMT: No tonsillar erythema, exudates, or enlargement; Moist mucous membranes, Good dentition, No lesions  NECK: Supple and normal thyroid.  No JVD or carotid bruit.  Carotid pulse is 2+ bilaterally.  HEART: Regular rate and rhythm; No murmurs, rubs, or gallops.  PULMONARY: Clear to auscultation and perfusion.  No rales, wheezing, or rhonchi bilaterally.  ABDOMEN: Soft, Nontender, Nondistended; Bowel sounds present  EXTREMITIES:  2+ Peripheral Pulses, No clubbing, cyanosis, or edema  LYMPH: No lymphadenopathy noted  NEUROLOGICAL: Grossly nonfocal          INTERPRETATION OF TELEMETRY:   Normal sinus rhythm  70's.      ECG:  Normal sinus rhythm with LAD.  Incomplete  left BBB. QRS 110ms.  QTc 469ms        LABS:                        14.2   7.51  )-----------( 190      ( 27 Aug 2018 06:35 )             42.2     08-27    144  |  107  |  17  ----------------------------<  94  4.1   |  26  |  0.89    Ca    9.1      27 Aug 2018 06:35  Phos  4.5     08-25  Mg     2.3     08-25      RADIOLOGY & ADDITIONAL STUDIES:  PREVIOUS DIAGNOSTIC TESTING:      ECHO  FINDINGS:  < from: Transthoracic Echocardiogram (18 @ 13:32) >  ------------------------------------------------------------------------  CONCLUSIONS:  1. Mitral annular calcification, otherwise normal mitral  valve. Mild mitral regurgitation.  2. Calcified trileaflet aortic valve with normal opening.  Moderate aortic regurgitation.  Vena contracta width about  0.4 cm.  3. Normal left ventricular internal dimensions and wall  thicknesses.  4. Endocardium not well visualized; grossly normal left  ventricular systolic function.  5. The right ventricle is not well visualized; grossly  normal right ventricular systolic function.  6. Estimated right ventricular systolic pressureequals 41  mm Hg, assuming right atrial pressure equals 10 mm Hg,  consistent with mild pulmonary hypertension.  7. A bubble study was performed with the intravenous  injection of agitated saline contrast.  Following contrast  injection, bubbles wereseen in the left heart.  This may  reflect the presence of an intracardiac shunt.  No obvious cardiac source of embolus was identified on this  transthoracic study.  If clinical suspicion is high,  consider CLAUDIA for further evaluation.      STRESS  FINDINGS:             NA    CATHETERIZATION  FINDINGS:                      N

## 2018-08-27 NOTE — CONSULT NOTE ADULT - ASSESSMENT
79 y/o M nonsmoker PMHx undiagnosed HTN, cataract surgery presented with an acute CVA. 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  Symptoms have completely resolved. There is no EKG or telemetry evidence of atrial fibrillation therefore, patient would benefit from prolonged monitoring .      . 79 y/o M nonsmoker PMHx undiagnosed HTN, cataract surgery presented with an acute CVA. 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  Symptoms have completely resolved. There is no EKG or telemetry evidence of atrial fibrillation.  As per Crystal -AF, patient would benefit from prolonged monitoring for detection of AFib/PAF as a cause of a cardioembolic source. I have spoken to him about an ILR.  We discussed the risks, benefits and alternatives to the device and the implication of living with the device and he agrees to the implant and will be consented.   Plan :  ILR tomorrow after CT scan and CLAUDIA.             Patient should be NPO for 2 hours prior to the procedure..      .

## 2018-08-27 NOTE — PROGRESS NOTE ADULT - SUBJECTIVE AND OBJECTIVE BOX
[facility] NEUROLOGY CONSULT SERVICE  ROE BENNETT  Male  MRN-[id number]    BACKGROUND:  77 y/o M nonsmoker PMHx undiagnosed HTN, cataract surgery p/w difficulty coordinating fingers x 3.5 hrs.  Pt notes ~ 7AM while at computer, typing, pt states he developed difficulty "typing as fast as usual" with difficulty worse with right hand.  Pt also notes "feeling funny" at the skin around his head and neck, but cannot elaborate further.  Pt notes symptoms minimally improved at this time, stating he is able to move finger and hands faster than before, but slower than baseline. Pt was noted to have SBP in lower 200s as per ED team.   At the time of my eval, Pt reports he feels like his usual self, confirmed by wife also. No facial asymmetry dysarthria, aphasia or gross abnormalities reported. Very mild subjective diminished sensation to light touch only in left foot dorsal surface. Pt states his BP was in 180s systolic earlier this morning but usually is in 120-130s range. He has not followed with any doctors in a long time and does not take any medications except for vitamin/supplements.   Pt denies any fevers, chills,  HA, dizziness, palpitations, nausea, vomiting, chest pain, SOB, headache, numbness or tingling, weakness, head trauma, use of blood thinners, illicit drug use.    NIHSS =1, MRS= 0    Interval Hx: MRI showing evidence of shower emboli admitted for cardioembolic workup. pt feels well today, refusing all meds.     PAST MEDICAL & SURGICAL HISTORY:  No pertinent past medical history  S/P cataract surgery, HTN    Social Hx: Lives with family. No toxic habits.   FAMILY HISTORY:  Family history of heart disease (Father): father (sudden death at age 56 years; heart disease presumed to be due to Rheumatic heart disease  Family history of mental disorder (Mother): mother ( at age 91 years)    Home Medications:  boswellia alexis: orally once a day (25 Aug 2018 22:12)  chlorpheniramine: orally once a day, As Needed (25 Aug 2018 20:11)  Ginkgo Biloba: orally once a day (25 Aug 2018 22:11)  Multiple Vitamins oral tablet: 1 tab(s) orally once a day (25 Aug 2018 22:13)  Vitamin B Complex 100:  (25 Aug 2018 22:08)  Vitamin B12:  (25 Aug 2018 22:08)  Vitamin C 1000 mg oral tablet: 6 tab(s) orally once a day (6 grams daily) (25 Aug 2018 22:08)  Vitamin D3 10,000 intl units oral capsule: 1 cap(s) orally once a day - during the summer months (25 Aug 2018 22:09)  Vitamin D3 1000 intl units oral tablet: 12 tab(s) orally once a day (12,000 IU daily during winter months) (25 Aug 2018 22:10)    MEDICATIONS  (STANDING):  aspirin enteric coated 81 milliGRAM(s) Oral daily  atorvastatin 80 milliGRAM(s) Oral at bedtime  clopidogrel Tablet 75 milliGRAM(s) Oral daily    MEDICATIONS  (PRN): none.    Allergies  No Known Allergies    Vital Signs Last 24 Hrs  T(C): 36.7 (27 Aug 2018 06:37), Max: 37.3 (26 Aug 2018 14:45)  T(F): 98.1 (27 Aug 2018 06:37), Max: 99.2 (26 Aug 2018 14:45)  HR: 58 (27 Aug 2018 06:37) (58 - 60)  BP: 156/78 (27 Aug 2018 06:37) (142/78 - 156/78)  BP(mean): --  RR: 18 (27 Aug 2018 06:37) (18 - 18)  SpO2: 97% (27 Aug 2018 06:37) (97% - 100%)    PHYSICAL EXAM:  Constitutional: no acute distress, well nourished, well developed   Cardiovascular: RRR, S1/2 normal, no MRG  Musculoskeletal:  no clubbing or cyanosis of the fingernails, no joint swelling, no myalgia  Eyes:  normal sclera and conjunctiva and pupils, no JODIE, no APD. Fundi: no papilledema.  Neurologic:  - Mental Status:  Alert, awake, oriented to person, place, and time; Speech is fluent with intact naming, repetition, and comprehension; Immediate recall is 3/3 words and delayed recall is 3/3 words at 5 minutes;   - Cranial Nerves II-XII:    II:  VFF, Pupils are equal, round, and reactive to light.  III, IV, VI:  Extraocular movements are intact without nystagmus.  V:  Facial sensation is intact in the V1-V3 distribution bilaterally.  VII:  Face is symmetric with normal eye closure and smile  VIII:  Hearing is intact to finger rub.  IX, X:  Uvula is midline and soft palate rises symmetrically  XI:  Head turning and shoulder shrug are intact.  XII:  Tongue protrudes in the midline.  - Motor:  Strength is 5/5 throughout.  There is no pronator drift.  Normal muscle bulk and tone throughout.  - Reflexes:  2+ and symmetric at the biceps, triceps, brachioradialis, knees, and ankles.  Plantar responses flexor.  - Sensory:  Intact to light touch, pin prick, throughout except for mildly diminished sense on dorsal surface of left  foot  - Coordination:  Finger-nose-finger and heel-knee-shin intact without dysmetria.  Rapid alternating hand movements mild difficulty  - Gait:   deferred    LABS:                          15.6   8.72  )-----------( 197      ( 25 Aug 2018 10:49 )left              45.5         142  |  104  |  20  ----------------------------<  105<H>  4.9   |  25  |  0.87    Ca    9.5      25 Aug 2018 10:49    TPro  7.5  /  Alb  4.2  /  TBili  0.5  /  DBili  x   /  AST  50<H>  /  ALT  29  /  AlkPhos  71        RADIOLOGY & ADDITIONAL STUDIES:   < from: CT Brain Stroke Protocol (18 @ 10:56) >  IMPRESSION:    No acute intracranial hemorrhage, mass effect or midline shift.    The above findings were discussed with Dr. Mon by Dr. Deleon on 2018   10:55 AM, with read-back verification.    < end of copied text >     < from: CT Angio Neck w/ IV Cont (18 @ 10:56) >  IMPRESSION:     CTA NECK: No evidence of hemodynamically significant stenosis using   NASCET criteria. No evidence of arterial dissection.    CTA BRAIN: No major vessel occlusion or proximal stenosis.  Normal   anatomic variants as discussed in the body the report.    < end of copied text >    < from: MR Head No Cont (18 @ 15:54) >  Magnetic resonance imaging of the brain was carried out with transaxial   SPGR, FLAIR, fast spin echo T2 weighted images, axial susceptibility   weighted series, diffusion weighted series and sagittal T1 weighted   series on a 1.5 Ai magnet.    Comparison is made with the prior CT/CTA of earlier today.    Atrophy is identified with ventricular and sulcal prominence.    There are multiple tiny scattered infarcts on diffusion-weighted imaging   in the left centrum semiovale ovale, adjacent to the left lateral   ventricle, and the left parietal white matter and in the right occipital   region. Additionally there are a few scattered apparent areas of   restriction in the bridgette bilaterally. These are suspicious for embolic   infarcts in view of the multiple vascular territory involvement. Small   vessel white matter ischemic changes are noted.    There is no acute hemorrhage.    Sellar and parasellar structures are unremarkable.    < end of copied text >  ----------------------------------------------------    Assessment:  77 y/o M nonsmoker PMHx of likely undiagnosed HTN, cataract surgery p/w difficulty coordinating fingers that occurred around 7am on  which resolved on presentation to hospital, found to have evidence of multiple tiny scattered infarcts in both hemispheres, likely cardioembolic etiology 2/2 to afib. Less likely septic emboli. Admitted for further workup.  Pt is strict vegan, takes multiple supplements, not on any medications and does not follow with any physician.  A1c acceptable.    - TTE w/ bubble pending  - c/w aspirin, plavix, statin  - cardio for loop recorder [facility] NEUROLOGY CONSULT SERVICE  ROE BENNETT  Male  MRN-[id number]    BACKGROUND:  79 y/o M nonsmoker PMHx undiagnosed HTN, cataract surgery p/w difficulty coordinating fingers x 3.5 hrs.  Pt notes ~ 7AM while at computer, typing, pt states he developed difficulty "typing as fast as usual" with difficulty worse with right hand.  Pt also notes "feeling funny" at the skin around his head and neck, but cannot elaborate further.  Pt notes symptoms minimally improved at this time, stating he is able to move finger and hands faster than before, but slower than baseline. Pt was noted to have SBP in lower 200s as per ED team.   At the time of my eval, Pt reports he feels like his usual self, confirmed by wife also. No facial asymmetry dysarthria, aphasia or gross abnormalities reported. Very mild subjective diminished sensation to light touch only in left foot dorsal surface. Pt states his BP was in 180s systolic earlier this morning but usually is in 120-130s range. He has not followed with any doctors in a long time and does not take any medications except for vitamin/supplements.   Pt denies any fevers, chills,  HA, dizziness, palpitations, nausea, vomiting, chest pain, SOB, headache, numbness or tingling, weakness, head trauma, use of blood thinners, illicit drug use.    NIHSS =1, MRS= 0  MRI showing evidence of shower emboli admitted for cardioembolic workup.    Interval Hx: pt feels well today, except for having an episode of numbness under the right armpit and right face around 7:30. Pt was also refusing all meds since he has been admitted including DAPT, DVT PPx.    PAST MEDICAL & SURGICAL HISTORY:  No pertinent past medical history  S/P cataract surgery, HTN    Social Hx: Lives with family. No toxic habits.   FAMILY HISTORY:  Family history of heart disease (Father): father (sudden death at age 56 years; heart disease presumed to be due to Rheumatic heart disease  Family history of mental disorder (Mother): mother ( at age 91 years)    Home Medications:  boswellia alexis: orally once a day (25 Aug 2018 22:12)  chlorpheniramine: orally once a day, As Needed (25 Aug 2018 20:11)  Ginkgo Biloba: orally once a day (25 Aug 2018 22:11)  Multiple Vitamins oral tablet: 1 tab(s) orally once a day (25 Aug 2018 22:13)  Vitamin B Complex 100:  (25 Aug 2018 22:08)  Vitamin B12:  (25 Aug 2018 22:08)  Vitamin C 1000 mg oral tablet: 6 tab(s) orally once a day (6 grams daily) (25 Aug 2018 22:08)  Vitamin D3 10,000 intl units oral capsule: 1 cap(s) orally once a day - during the summer months (25 Aug 2018 22:09)  Vitamin D3 1000 intl units oral tablet: 12 tab(s) orally once a day (12,000 IU daily during winter months) (25 Aug 2018 22:10)    MEDICATIONS  (STANDING):  aspirin enteric coated 81 milliGRAM(s) Oral daily  atorvastatin 80 milliGRAM(s) Oral at bedtime  clopidogrel Tablet 75 milliGRAM(s) Oral daily    MEDICATIONS  (PRN): none.    Allergies  No Known Allergies    Vital Signs Last 24 Hrs  T(C): 36.7 (27 Aug 2018 06:37), Max: 37.3 (26 Aug 2018 14:45)  T(F): 98.1 (27 Aug 2018 06:37), Max: 99.2 (26 Aug 2018 14:45)  HR: 58 (27 Aug 2018 06:37) (58 - 60)  BP: 156/78 (27 Aug 2018 06:37) (142/78 - 156/78)  BP(mean): --  RR: 18 (27 Aug 2018 06:37) (18 - 18)  SpO2: 97% (27 Aug 2018 06:37) (97% - 100%)    PHYSICAL EXAM:  Constitutional: no acute distress, well nourished, well developed   Cardiovascular: RRR, S1/2 normal, no MRG  Musculoskeletal:  no clubbing or cyanosis of the fingernails, no joint swelling, no myalgia  Eyes:  normal sclera and conjunctiva and pupils, no JODIE, no APD. Fundi: no papilledema.  Neurologic:  - Mental Status:  Alert, awake, oriented to person, place, and time; Speech is fluent with intact naming, repetition, and comprehension; Immediate recall is 3/3 words and delayed recall is 3/3 words at 5 minutes;   - Cranial Nerves II-XII:    II:  VFF, Pupils are equal, round, and reactive to light.  III, IV, VI:  Extraocular movements are intact without nystagmus.  V:  Facial sensation is intact in the V1-V3 distribution bilaterally.  VII:  Face is symmetric with normal eye closure and smile  VIII:  Hearing is intact to finger rub.  IX, X:  Uvula is midline and soft palate rises symmetrically  XI:  Head turning and shoulder shrug are intact.  XII:  Tongue protrudes in the midline.  - Motor:  Strength is 5/5 throughout.  There is no pronator drift.  Normal muscle bulk and tone throughout.  - Reflexes:  2+ and symmetric at the biceps, triceps, brachioradialis, knees, and ankles.  Plantar responses flexor.  - Sensory:  Intact to light touch throughout.  - Coordination:  Finger-nose-finger and heel-knee-shin intact without dysmetria.  Rapid alternating hand movements mild difficulty  - Gait:   deferred    LABS:                          15.6   8.72  )-----------( 197      ( 25 Aug 2018 10:49 )left              45.5         142  |  104  |  20  ----------------------------<  105<H>  4.9   |  25  |  0.87    Ca    9.5      25 Aug 2018 10:49    TPro  7.5  /  Alb  4.2  /  TBili  0.5  /  DBili  x   /  AST  50<H>  /  ALT  29  /  AlkPhos  71        RADIOLOGY & ADDITIONAL STUDIES:   < from: CT Brain Stroke Protocol (18 @ 10:56) >  IMPRESSION:    No acute intracranial hemorrhage, mass effect or midline shift.    The above findings were discussed with Dr. Mon by Dr. Deleon on 2018   10:55 AM, with read-back verification.    < end of copied text >     < from: CT Angio Neck w/ IV Cont (18 @ 10:56) >  IMPRESSION:     CTA NECK: No evidence of hemodynamically significant stenosis using   NASCET criteria. No evidence of arterial dissection.    CTA BRAIN: No major vessel occlusion or proximal stenosis.  Normal   anatomic variants as discussed in the body the report.    < end of copied text >    < from: MR Head No Cont (18 @ 15:54) >  Magnetic resonance imaging of the brain was carried out with transaxial   SPGR, FLAIR, fast spin echo T2 weighted images, axial susceptibility   weighted series, diffusion weighted series and sagittal T1 weighted   series on a 1.5 Ai magnet.    Comparison is made with the prior CT/CTA of earlier today.    Atrophy is identified with ventricular and sulcal prominence.    There are multiple tiny scattered infarcts on diffusion-weighted imaging   in the left centrum semiovale ovale, adjacent to the left lateral   ventricle, and the left parietal white matter and in the right occipital   region. Additionally there are a few scattered apparent areas of   restriction in the bridgette bilaterally. These are suspicious for embolic   infarcts in view of the multiple vascular territory involvement. Small   vessel white matter ischemic changes are noted.    There is no acute hemorrhage.    Sellar and parasellar structures are unremarkable.    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 Admitted for further workup.  Pt is strict vegan, takes multiple supplements, not on any home medications and does not follow with any physician.  A1c acceptable. Was refusing meds since admission including DAPT and DVT PPx.    - c/w tele  - In light of recurrent symptoms this morning, would benefit from CLAUDIA   - Load with plavix 300mg and aspirin 81, followed by ASA 81 plavix 75 for 3 weeks then aspirin 81 alone (pt now amenable after in-depth explanation in rounds)  - Atorvastatin 80  - cardio for loop recorder  - BCx negative so far, afebrile  - Stroke prevention education provided to patient at bedside  - Kindly contact us if there is a change in patient's condition or for any questions.    Thank you for involving us in the care of this patient. [facility] NEUROLOGY CONSULT SERVICE  ROE BENNETT  Male  MRN-[id number]    BACKGROUND:  79 y/o M nonsmoker PMHx undiagnosed HTN, cataract surgery p/w difficulty coordinating fingers x 3.5 hrs.  Pt notes ~ 7AM while at computer, typing, pt states he developed difficulty "typing as fast as usual" with difficulty worse with right hand.  Pt also notes "feeling funny" at the skin around his head and neck, but cannot elaborate further.  Pt notes symptoms minimally improved at this time, stating he is able to move finger and hands faster than before, but slower than baseline. Pt was noted to have SBP in lower 200s as per ED team.   At the time of my eval, Pt reports he feels like his usual self, confirmed by wife also. No facial asymmetry dysarthria, aphasia or gross abnormalities reported. Very mild subjective diminished sensation to light touch only in left foot dorsal surface. Pt states his BP was in 180s systolic earlier this morning but usually is in 120-130s range. He has not followed with any doctors in a long time and does not take any medications except for vitamin/supplements.   Pt denies any fevers, chills,  HA, dizziness, palpitations, nausea, vomiting, chest pain, SOB, headache, numbness or tingling, weakness, head trauma, use of blood thinners, illicit drug use.    NIHSS =1, MRS= 0  MRI showing evidence of shower emboli admitted for cardioembolic workup.    Interval Hx: pt feels well today, except for having an episode of numbness under the right armpit and right face around 7:30. Pt was also refusing all meds since he has been admitted including DAPT, DVT PPx.    PAST MEDICAL & SURGICAL HISTORY:  No pertinent past medical history  S/P cataract surgery, HTN    Social Hx: Lives with family. No toxic habits.   FAMILY HISTORY:  Family history of heart disease (Father): father (sudden death at age 56 years; heart disease presumed to be due to Rheumatic heart disease  Family history of mental disorder (Mother): mother ( at age 91 years)    Home Medications:  boswellia alexis: orally once a day (25 Aug 2018 22:12)  chlorpheniramine: orally once a day, As Needed (25 Aug 2018 20:11)  Ginkgo Biloba: orally once a day (25 Aug 2018 22:11)  Multiple Vitamins oral tablet: 1 tab(s) orally once a day (25 Aug 2018 22:13)  Vitamin B Complex 100:  (25 Aug 2018 22:08)  Vitamin B12:  (25 Aug 2018 22:08)  Vitamin C 1000 mg oral tablet: 6 tab(s) orally once a day (6 grams daily) (25 Aug 2018 22:08)  Vitamin D3 10,000 intl units oral capsule: 1 cap(s) orally once a day - during the summer months (25 Aug 2018 22:09)  Vitamin D3 1000 intl units oral tablet: 12 tab(s) orally once a day (12,000 IU daily during winter months) (25 Aug 2018 22:10)    MEDICATIONS  (STANDING):  aspirin enteric coated 81 milliGRAM(s) Oral daily  atorvastatin 80 milliGRAM(s) Oral at bedtime  clopidogrel Tablet 75 milliGRAM(s) Oral daily    MEDICATIONS  (PRN): none.    Allergies  No Known Allergies    Vital Signs Last 24 Hrs  T(C): 36.7 (27 Aug 2018 06:37), Max: 37.3 (26 Aug 2018 14:45)  T(F): 98.1 (27 Aug 2018 06:37), Max: 99.2 (26 Aug 2018 14:45)  HR: 58 (27 Aug 2018 06:37) (58 - 60)  BP: 156/78 (27 Aug 2018 06:37) (142/78 - 156/78)  BP(mean): --  RR: 18 (27 Aug 2018 06:37) (18 - 18)  SpO2: 97% (27 Aug 2018 06:37) (97% - 100%)    PHYSICAL EXAM:  Constitutional: no acute distress, well nourished, well developed   Cardiovascular: RRR, S1/2 normal, no MRG  Musculoskeletal:  no clubbing or cyanosis of the fingernails, no joint swelling, no myalgia  Eyes:  normal sclera and conjunctiva and pupils, no JODIE, no APD. Fundi: no papilledema.  Neurologic:  - Mental Status:  Alert, awake, oriented to person, place, and time; Speech is fluent with intact naming, repetition, and comprehension; Immediate recall is 3/3 words and delayed recall is 3/3 words at 5 minutes;   - Cranial Nerves II-XII:    II:  VFF, Pupils are equal, round, and reactive to light.  III, IV, VI:  Extraocular movements are intact without nystagmus.  V:  Facial sensation is intact in the V1-V3 distribution bilaterally.  VII:  Face is symmetric with normal eye closure and smile  VIII:  Hearing is intact to finger rub.  IX, X:  Uvula is midline and soft palate rises symmetrically  XI:  Head turning and shoulder shrug are intact.  XII:  Tongue protrudes in the midline.  - Motor:  Strength is 5/5 throughout.  There is no pronator drift.  Normal muscle bulk and tone throughout.  - Reflexes:  2+ and symmetric at the biceps, triceps, brachioradialis, knees, and ankles.  Plantar responses flexor.  - Sensory:  Intact to light touch throughout.  - Coordination:  Finger-nose-finger and heel-knee-shin intact without dysmetria.  Rapid alternating hand movements mild difficulty  - Gait:   deferred    LABS:                          15.6   8.72  )-----------( 197      ( 25 Aug 2018 10:49 )left              45.5         142  |  104  |  20  ----------------------------<  105<H>  4.9   |  25  |  0.87    Ca    9.5      25 Aug 2018 10:49    TPro  7.5  /  Alb  4.2  /  TBili  0.5  /  DBili  x   /  AST  50<H>  /  ALT  29  /  AlkPhos  71        RADIOLOGY & ADDITIONAL STUDIES:   < from: CT Brain Stroke Protocol (18 @ 10:56) >  IMPRESSION:    No acute intracranial hemorrhage, mass effect or midline shift.    The above findings were discussed with Dr. Mon by Dr. Deleon on 2018   10:55 AM, with read-back verification.    < end of copied text >     < from: CT Angio Neck w/ IV Cont (18 @ 10:56) >  IMPRESSION:     CTA NECK: No evidence of hemodynamically significant stenosis using   NASCET criteria. No evidence of arterial dissection.    CTA BRAIN: No major vessel occlusion or proximal stenosis.  Normal   anatomic variants as discussed in the body the report.    < end of copied text >    < from: MR Head No Cont (18 @ 15:54) >  Magnetic resonance imaging of the brain was carried out with transaxial   SPGR, FLAIR, fast spin echo T2 weighted images, axial susceptibility   weighted series, diffusion weighted series and sagittal T1 weighted   series on a 1.5 Ai magnet.    Comparison is made with the prior CT/CTA of earlier today.    Atrophy is identified with ventricular and sulcal prominence.    There are multiple tiny scattered infarcts on diffusion-weighted imaging   in the left centrum semiovale ovale, adjacent to the left lateral   ventricle, and the left parietal white matter and in the right occipital   region. Additionally there are a few scattered apparent areas of   restriction in the bridgette bilaterally. These are suspicious for embolic   infarcts in view of the multiple vascular territory involvement. Small   vessel white matter ischemic changes are noted.    There is no acute hemorrhage.    Sellar and parasellar structures are unremarkable.    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 Admitted for further workup.  Pt is strict vegan, takes multiple supplements, not on any home medications and does not follow with any physician.  A1c acceptable. Was refusing meds since admission including DAPT and DVT PPx.    Plan:  - c/w tele  - In light of recurrent symptoms this morning, would benefit from CLAUDIA   - Load with plavix 300mg and aspirin 81, followed by ASA 81 plavix 75 for 3 weeks then aspirin 81 alone (pt now amenable after in-depth explanation in rounds)  - Atorvastatin 80  - cardio for loop recorder  - BCx negative so far, afebrile  - Stroke prevention education provided to patient at bedside  - Kindly contact us if there is a change in patient's condition or for any questions.    Thank you for involving us in the care of this patient. [facility] NEUROLOGY CONSULT SERVICE  ROE BENNETT  Male  MRN-[id number]    BACKGROUND:  79 y/o M nonsmoker PMHx undiagnosed HTN, cataract surgery p/w difficulty coordinating fingers x 3.5 hrs.  Pt notes ~ 7AM while at computer, typing, pt states he developed difficulty "typing as fast as usual" with difficulty worse with right hand.  Pt also notes "feeling funny" at the skin around his head and neck, but cannot elaborate further.  Pt notes symptoms minimally improved at this time, stating he is able to move finger and hands faster than before, but slower than baseline. Pt was noted to have SBP in lower 200s as per ED team.   At the time of my eval, Pt reports he feels like his usual self, confirmed by wife also. No facial asymmetry dysarthria, aphasia or gross abnormalities reported. Very mild subjective diminished sensation to light touch only in left foot dorsal surface. Pt states his BP was in 180s systolic earlier this morning but usually is in 120-130s range. He has not followed with any doctors in a long time and does not take any medications except for vitamin/supplements.   Pt denies any fevers, chills,  HA, dizziness, palpitations, nausea, vomiting, chest pain, SOB, headache, numbness or tingling, weakness, head trauma, use of blood thinners, illicit drug use.    NIHSS =1, MRS= 0  MRI showing evidence of shower emboli admitted for cardioembolic workup.    Interval Hx: pt feels well today, except for having an episode of numbness under the right armpit and right face around 7:30. Pt was also refusing all meds since he has been admitted including DAPT, DVT PPx.    PAST MEDICAL & SURGICAL HISTORY:  No pertinent past medical history  S/P cataract surgery, HTN    Social Hx: Lives with family. No toxic habits.   FAMILY HISTORY:  Family history of heart disease (Father): father (sudden death at age 56 years; heart disease presumed to be due to Rheumatic heart disease  Family history of mental disorder (Mother): mother ( at age 91 years)    Home Medications:  boswellia alexis: orally once a day (25 Aug 2018 22:12)  chlorpheniramine: orally once a day, As Needed (25 Aug 2018 20:11)  Ginkgo Biloba: orally once a day (25 Aug 2018 22:11)  Multiple Vitamins oral tablet: 1 tab(s) orally once a day (25 Aug 2018 22:13)  Vitamin B Complex 100:  (25 Aug 2018 22:08)  Vitamin B12:  (25 Aug 2018 22:08)  Vitamin C 1000 mg oral tablet: 6 tab(s) orally once a day (6 grams daily) (25 Aug 2018 22:08)  Vitamin D3 10,000 intl units oral capsule: 1 cap(s) orally once a day - during the summer months (25 Aug 2018 22:09)  Vitamin D3 1000 intl units oral tablet: 12 tab(s) orally once a day (12,000 IU daily during winter months) (25 Aug 2018 22:10)    MEDICATIONS  (STANDING):  aspirin enteric coated 81 milliGRAM(s) Oral daily  atorvastatin 80 milliGRAM(s) Oral at bedtime  clopidogrel Tablet 75 milliGRAM(s) Oral daily    MEDICATIONS  (PRN): none.    Allergies  No Known Allergies    Vital Signs Last 24 Hrs  T(C): 36.7 (27 Aug 2018 06:37), Max: 37.3 (26 Aug 2018 14:45)  T(F): 98.1 (27 Aug 2018 06:37), Max: 99.2 (26 Aug 2018 14:45)  HR: 58 (27 Aug 2018 06:37) (58 - 60)  BP: 156/78 (27 Aug 2018 06:37) (142/78 - 156/78)  BP(mean): --  RR: 18 (27 Aug 2018 06:37) (18 - 18)  SpO2: 97% (27 Aug 2018 06:37) (97% - 100%)    PHYSICAL EXAM:  Constitutional: no acute distress, well nourished, well developed   Musculoskeletal:  no clubbing or cyanosis of the fingernails, no joint swelling, no myalgia  Eyes:  normal sclera and conjunctiva and pupils, no JODIE, no APD. Fundi: no papilledema.  Neurologic:  - Mental Status:  Alert, awake, oriented to person, place, and time; Speech is fluent with intact naming, repetition, and comprehension; Immediate recall is 3/3 words and delayed recall is 3/3 words at 5 minutes;   - Cranial Nerves II-XII:    II:  VFF, Pupils are equal, round, and reactive to light.  III, IV, VI:  Extraocular movements are intact without nystagmus.  V:  Facial sensation is intact in the V1-V3 distribution bilaterally.  VII:  Face is symmetric with normal eye closure and smile  VIII:  Hearing is intact to finger rub.  IX, X:  Uvula is midline and soft palate rises symmetrically  XI:  Head turning and shoulder shrug are intact.  XII:  Tongue protrudes in the midline.  - Motor:  Strength is 5/5 throughout.  There is no pronator drift.  Normal muscle bulk and tone throughout.  - Reflexes:  2+ and symmetric at the biceps, triceps, brachioradialis, knees, and ankles.  Plantar responses flexor.  - Sensory:  Intact to light touch throughout.  - Coordination:  Finger-nose-finger and heel-knee-shin intact without dysmetria.  Rapid alternating hand movements mild difficulty  - Gait:   deferred    LABS:                          15.6   8.72  )-----------( 197      ( 25 Aug 2018 10:49 )left              45.5         142  |  104  |  20  ----------------------------<  105<H>  4.9   |  25  |  0.87    Ca    9.5      25 Aug 2018 10:49    TPro  7.5  /  Alb  4.2  /  TBili  0.5  /  DBili  x   /  AST  50<H>  /  ALT  29  /  AlkPhos  71        RADIOLOGY & ADDITIONAL STUDIES:   < from: CT Brain Stroke Protocol (18 @ 10:56) >  IMPRESSION:    No acute intracranial hemorrhage, mass effect or midline shift.    The above findings were discussed with Dr. Mon by Dr. Deleon on 2018   10:55 AM, with read-back verification.    < end of copied text >     < from: CT Angio Neck w/ IV Cont (18 @ 10:56) >  IMPRESSION:     CTA NECK: No evidence of hemodynamically significant stenosis using   NASCET criteria. No evidence of arterial dissection.    CTA BRAIN: No major vessel occlusion or proximal stenosis.  Normal   anatomic variants as discussed in the body the report.    < end of copied text >    < from: MR Head No Cont (18 @ 15:54) >  Magnetic resonance imaging of the brain was carried out with transaxial   SPGR, FLAIR, fast spin echo T2 weighted images, axial susceptibility   weighted series, diffusion weighted series and sagittal T1 weighted   series on a 1.5 Ai magnet.    Comparison is made with the prior CT/CTA of earlier today.    Atrophy is identified with ventricular and sulcal prominence.    There are multiple tiny scattered infarcts on diffusion-weighted imaging   in the left centrum semiovale ovale, adjacent to the left lateral   ventricle, and the left parietal white matter and in the right occipital   region. Additionally there are a few scattered apparent areas of   restriction in the bridgette bilaterally. These are suspicious for embolic   infarcts in view of the multiple vascular territory involvement. Small   vessel white matter ischemic changes are noted.    There is no acute hemorrhage.    Sellar and parasellar structures are unremarkable.    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 Admitted for further workup.  Pt is strict vegan, takes multiple supplements, not on any home medications and does not follow with any physician.  A1c acceptable. Was refusing meds since admission including DAPT and DVT PPx.    Plan:  - c/w tele  - In light of recurrent symptoms this morning, would benefit from CLAUDIA   - Load with plavix 300mg and aspirin 81, followed by ASA 81 plavix 75 for 3 weeks then aspirin 81 alone (pt now amenable after in-depth explanation in rounds)  - Atorvastatin 80  - cardio for loop recorder  - BCx negative so far, afebrile  - Stroke prevention education provided to patient at bedside  - Kindly contact us if there is a change in patient's condition or for any questions.    Thank you for involving us in the care of this patient. [facility] NEUROLOGY FOLLOW UP NOTE  ROE BENNETT  Male  MRN-[id number]    BACKGROUND:  77 y/o M nonsmoker PMHx undiagnosed HTN, cataract surgery p/w difficulty coordinating fingers x 3.5 hrs.  Pt notes ~ 7AM while at computer, typing, pt states he developed difficulty "typing as fast as usual" with difficulty worse with right hand.  Pt also notes "feeling funny" at the skin around his head and neck, but cannot elaborate further.  Pt notes symptoms minimally improved at this time, stating he is able to move finger and hands faster than before, but slower than baseline. Pt was noted to have SBP in lower 200s as per ED team.   At the time of my eval, Pt reports he feels like his usual self, confirmed by wife also. No facial asymmetry dysarthria, aphasia or gross abnormalities reported. Very mild subjective diminished sensation to light touch only in left foot dorsal surface. Pt states his BP was in 180s systolic earlier this morning but usually is in 120-130s range. He has not followed with any doctors in a long time and does not take any medications except for vitamin/supplements.   Pt denies any fevers, chills,  HA, dizziness, palpitations, nausea, vomiting, chest pain, SOB, headache, numbness or tingling, weakness, head trauma, use of blood thinners, illicit drug use.    NIHSS =1, MRS= 0  MRI showing evidence of shower emboli admitted for cardioembolic workup.    Interval Hx: pt feels well today, except for having an episode of numbness under the right armpit and right face around 7:30. Pt was also refusing all meds since he has been admitted including DAPT, DVT PPx.    PAST MEDICAL & SURGICAL HISTORY:  No pertinent past medical history  S/P cataract surgery, HTN    Social Hx: Lives with family. No toxic habits.   FAMILY HISTORY:  Family history of heart disease (Father): father (sudden death at age 56 years; heart disease presumed to be due to Rheumatic heart disease  Family history of mental disorder (Mother): mother ( at age 91 years)    Home Medications:  boswellia alexis: orally once a day (25 Aug 2018 22:12)  chlorpheniramine: orally once a day, As Needed (25 Aug 2018 20:11)  Ginkgo Biloba: orally once a day (25 Aug 2018 22:11)  Multiple Vitamins oral tablet: 1 tab(s) orally once a day (25 Aug 2018 22:13)  Vitamin B Complex 100:  (25 Aug 2018 22:08)  Vitamin B12:  (25 Aug 2018 22:08)  Vitamin C 1000 mg oral tablet: 6 tab(s) orally once a day (6 grams daily) (25 Aug 2018 22:08)  Vitamin D3 10,000 intl units oral capsule: 1 cap(s) orally once a day - during the summer months (25 Aug 2018 22:09)  Vitamin D3 1000 intl units oral tablet: 12 tab(s) orally once a day (12,000 IU daily during winter months) (25 Aug 2018 22:10)    MEDICATIONS  (STANDING):  aspirin enteric coated 81 milliGRAM(s) Oral daily  atorvastatin 80 milliGRAM(s) Oral at bedtime  clopidogrel Tablet 75 milliGRAM(s) Oral daily    MEDICATIONS  (PRN): none.    Allergies  No Known Allergies    Vital Signs Last 24 Hrs  T(C): 36.7 (27 Aug 2018 06:37), Max: 37.3 (26 Aug 2018 14:45)  T(F): 98.1 (27 Aug 2018 06:37), Max: 99.2 (26 Aug 2018 14:45)  HR: 58 (27 Aug 2018 06:37) (58 - 60)  BP: 156/78 (27 Aug 2018 06:37) (142/78 - 156/78)  BP(mean): --  RR: 18 (27 Aug 2018 06:37) (18 - 18)  SpO2: 97% (27 Aug 2018 06:37) (97% - 100%)    PHYSICAL EXAM:  Constitutional: no acute distress, well nourished, well developed   Musculoskeletal:  no clubbing or cyanosis of the fingernails, no joint swelling, no myalgia  Eyes:  normal sclera and conjunctiva and pupils, no JODIE, no APD. Fundi: no papilledema.  Neurologic:  - Mental Status:  Alert, awake, oriented to person, place, and time; Speech is fluent with intact naming, repetition, and comprehension; Immediate recall is 3/3 words and delayed recall is 3/3 words at 5 minutes;   - Cranial Nerves II-XII:    II:  VFF, Pupils are equal, round, and reactive to light.  III, IV, VI:  Extraocular movements are intact without nystagmus.  V:  Facial sensation is intact in the V1-V3 distribution bilaterally.  VII:  Face is symmetric with normal eye closure and smile  VIII:  Hearing is intact to finger rub.  IX, X:  Uvula is midline and soft palate rises symmetrically  XI:  Head turning and shoulder shrug are intact.  XII:  Tongue protrudes in the midline.  - Motor:  Strength is 5/5 throughout.  There is no pronator drift.  Normal muscle bulk and tone throughout.  - Reflexes:  2+ and symmetric at the biceps, triceps, brachioradialis, knees, and ankles.  Plantar responses flexor.  - Sensory:  Intact to light touch throughout.  - Coordination:  Finger-nose-finger and heel-knee-shin intact without dysmetria.  Rapid alternating hand movements mild difficulty  - Gait:   deferred    LABS:                          15.6   8.72  )-----------( 197      ( 25 Aug 2018 10:49 )left              45.5         142  |  104  |  20  ----------------------------<  105<H>  4.9   |  25  |  0.87    Ca    9.5      25 Aug 2018 10:49    TPro  7.5  /  Alb  4.2  /  TBili  0.5  /  DBili  x   /  AST  50<H>  /  ALT  29  /  AlkPhos  71        RADIOLOGY & ADDITIONAL STUDIES:   < from: CT Brain Stroke Protocol (18 @ 10:56) >  IMPRESSION:    No acute intracranial hemorrhage, mass effect or midline shift.    The above findings were discussed with Dr. Mon by Dr. Deleon on 2018   10:55 AM, with read-back verification.    < end of copied text >     < from: CT Angio Neck w/ IV Cont (18 @ 10:56) >  IMPRESSION:     CTA NECK: No evidence of hemodynamically significant stenosis using   NASCET criteria. No evidence of arterial dissection.    CTA BRAIN: No major vessel occlusion or proximal stenosis.  Normal   anatomic variants as discussed in the body the report.    < end of copied text >    < from: MR Head No Cont (18 @ 15:54) >  Magnetic resonance imaging of the brain was carried out with transaxial   SPGR, FLAIR, fast spin echo T2 weighted images, axial susceptibility   weighted series, diffusion weighted series and sagittal T1 weighted   series on a 1.5 Ai magnet.    Comparison is made with the prior CT/CTA of earlier today.    Atrophy is identified with ventricular and sulcal prominence.    There are multiple tiny scattered infarcts on diffusion-weighted imaging   in the left centrum semiovale ovale, adjacent to the left lateral   ventricle, and the left parietal white matter and in the right occipital   region. Additionally there are a few scattered apparent areas of   restriction in the bridgette bilaterally. These are suspicious for embolic   infarcts in view of the multiple vascular territory involvement. Small   vessel white matter ischemic changes are noted.    There is no acute hemorrhage.    Sellar and parasellar structures are unremarkable.    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 Admitted for further workup.  Pt is strict vegan, takes multiple supplements, not on any home medications and does not follow with any physician.  A1c acceptable. Was refusing meds since admission including DAPT and DVT PPx.    Plan:  - c/w tele  - In light of recurrent symptoms this morning, would benefit from CLAUDIA   - Load with plavix 300mg and aspirin 81, followed by ASA 81 plavix 75 for 3 weeks then aspirin 81 alone (pt now amenable after in-depth explanation in rounds)  - Atorvastatin 80  - cardio for loop recorder  - BCx negative so far, afebrile  - Stroke prevention education provided to patient at bedside  - Kindly contact us if there is a change in patient's condition or for any questions.    Thank you for involving us in the care of this patient.

## 2018-08-27 NOTE — PROGRESS NOTE ADULT - ATTENDING COMMENTS
I have seen and examined this patient with the stroke neurology team.     Overnight events were reviewed with the patient and/or available family members.   ROS: All negative except documented in the HPI.   Neurological exam was performed and agree with exam as documented above.   Laboratory results and imaging studies were reviewed by me.   I agree with the neurology resident note as documented above.    78 years old man with vascular risk factors of age and ? HTN (denies regular medical followup) is evaluated at [facility] for an acute onset of right-hand weakness/clumsiness. This morning around 7:30-7:40 AM, he reports to have noticed acute onset of sensory paresthesia or right arm and shoulder with complete resolution subsequently. Neurological examination today is nonfocal. MRI brain showed acute/subacute small punctate embolic-looking infarcts involving multiple vascular distribution including bilateral MCAs, left VALERIE and basilar artery. CTA head and neck is grossly unremarkable. Of note, he has not been receiving antiplatelet therapy since his admission. Due to patient refusal.     Impression:  Cerebral embolism with cerebral infarction. Stroke involving multiple vascular distributions including bilateral MCAs, left VALERIE and posterior circulation - likely etiology being cryptogenic stroke, probably related to embolism from a proximal source like cardiac/paradoxical source of embolism    Plan:  - Aspirin and clopidogrel (load with 300 mg today especially in the setting of new focal neurological symptoms this morning followed by 75 mg once a day from tomorrow) for aggressive secondary stroke prevention for 3 weeks followed by aspirin. Risks versus benefits, and adverse reactions/complications associated with medication use were discussed with him and his wife at bedside in detail. Importance of medication compliance was reemphasized  - Agree with pharmacological DVT prophylaxis   - Atorvastatin 80 mg at bedtime considering admission. LDL  - HbA1C 5.0, continue with aggressive vascular risk factors modifications   - Permissive HTN up to 220/110 mmHg for first 48-72 hours from symptom onset followed by gradual normotension  - TTE with bubble study and continue with telemetry to screen for possible cardiac source of embolism  - Prolonged cardiac monitoring with ICM to screen for occult cardiac arrhythmia like atrial fibrillation being the cause of possible cardiac source of embolism to be considered based on results of above mentioned cardiac tests    - PT/OT/Speech and swallow/safety eval  - Stroke education    Above mentioned plan was discussed with patient and available family member in detail. All the questions were answered and concerns were addressed.

## 2018-08-28 PROBLEM — S83.8X9A SPRAIN OF OTHER SPECIFIED PARTS OF UNSPECIFIED KNEE, INITIAL ENCOUNTER: Chronic | Status: ACTIVE | Noted: 2018-08-25

## 2018-08-28 LAB
BASOPHILS # BLD AUTO: 0.05 K/UL — SIGNIFICANT CHANGE UP (ref 0–0.2)
BASOPHILS NFR BLD AUTO: 0.7 % — SIGNIFICANT CHANGE UP (ref 0–2)
BUN SERPL-MCNC: 15 MG/DL — SIGNIFICANT CHANGE UP (ref 7–23)
CALCIUM SERPL-MCNC: 9 MG/DL — SIGNIFICANT CHANGE UP (ref 8.4–10.5)
CHLORIDE SERPL-SCNC: 108 MMOL/L — HIGH (ref 98–107)
CO2 SERPL-SCNC: 25 MMOL/L — SIGNIFICANT CHANGE UP (ref 22–31)
CREAT SERPL-MCNC: 0.88 MG/DL — SIGNIFICANT CHANGE UP (ref 0.5–1.3)
EOSINOPHIL # BLD AUTO: 0.16 K/UL — SIGNIFICANT CHANGE UP (ref 0–0.5)
EOSINOPHIL NFR BLD AUTO: 2.2 % — SIGNIFICANT CHANGE UP (ref 0–6)
GLUCOSE SERPL-MCNC: 89 MG/DL — SIGNIFICANT CHANGE UP (ref 70–99)
HCT VFR BLD CALC: 41.9 % — SIGNIFICANT CHANGE UP (ref 39–50)
HGB BLD-MCNC: 14.2 G/DL — SIGNIFICANT CHANGE UP (ref 13–17)
IMM GRANULOCYTES # BLD AUTO: 0.03 # — SIGNIFICANT CHANGE UP
IMM GRANULOCYTES NFR BLD AUTO: 0.4 % — SIGNIFICANT CHANGE UP (ref 0–1.5)
LYMPHOCYTES # BLD AUTO: 1.57 K/UL — SIGNIFICANT CHANGE UP (ref 1–3.3)
LYMPHOCYTES # BLD AUTO: 21.4 % — SIGNIFICANT CHANGE UP (ref 13–44)
MAGNESIUM SERPL-MCNC: 2 MG/DL — SIGNIFICANT CHANGE UP (ref 1.6–2.6)
MCHC RBC-ENTMCNC: 32.2 PG — SIGNIFICANT CHANGE UP (ref 27–34)
MCHC RBC-ENTMCNC: 33.9 % — SIGNIFICANT CHANGE UP (ref 32–36)
MCV RBC AUTO: 95 FL — SIGNIFICANT CHANGE UP (ref 80–100)
MONOCYTES # BLD AUTO: 0.64 K/UL — SIGNIFICANT CHANGE UP (ref 0–0.9)
MONOCYTES NFR BLD AUTO: 8.7 % — SIGNIFICANT CHANGE UP (ref 2–14)
NEUTROPHILS # BLD AUTO: 4.87 K/UL — SIGNIFICANT CHANGE UP (ref 1.8–7.4)
NEUTROPHILS NFR BLD AUTO: 66.6 % — SIGNIFICANT CHANGE UP (ref 43–77)
NRBC # FLD: 0 — SIGNIFICANT CHANGE UP
PLATELET # BLD AUTO: 186 K/UL — SIGNIFICANT CHANGE UP (ref 150–400)
PMV BLD: 11.3 FL — SIGNIFICANT CHANGE UP (ref 7–13)
POTASSIUM SERPL-MCNC: 3.9 MMOL/L — SIGNIFICANT CHANGE UP (ref 3.5–5.3)
POTASSIUM SERPL-SCNC: 3.9 MMOL/L — SIGNIFICANT CHANGE UP (ref 3.5–5.3)
RBC # BLD: 4.41 M/UL — SIGNIFICANT CHANGE UP (ref 4.2–5.8)
RBC # FLD: 12 % — SIGNIFICANT CHANGE UP (ref 10.3–14.5)
SODIUM SERPL-SCNC: 144 MMOL/L — SIGNIFICANT CHANGE UP (ref 135–145)
WBC # BLD: 7.32 K/UL — SIGNIFICANT CHANGE UP (ref 3.8–10.5)
WBC # FLD AUTO: 7.32 K/UL — SIGNIFICANT CHANGE UP (ref 3.8–10.5)

## 2018-08-28 PROCEDURE — 93312 ECHO TRANSESOPHAGEAL: CPT | Mod: 26

## 2018-08-28 PROCEDURE — 99233 SBSQ HOSP IP/OBS HIGH 50: CPT

## 2018-08-28 PROCEDURE — 99223 1ST HOSP IP/OBS HIGH 75: CPT

## 2018-08-28 PROCEDURE — 93325 DOPPLER ECHO COLOR FLOW MAPG: CPT | Mod: 26,GC

## 2018-08-28 PROCEDURE — 99233 SBSQ HOSP IP/OBS HIGH 50: CPT | Mod: GC

## 2018-08-28 PROCEDURE — 76376 3D RENDER W/INTRP POSTPROCES: CPT | Mod: 26

## 2018-08-28 PROCEDURE — 93320 DOPPLER ECHO COMPLETE: CPT | Mod: 26,GC

## 2018-08-28 RX ADMIN — Medication 100 MILLIGRAM(S): at 07:48

## 2018-08-28 NOTE — PROGRESS NOTE ADULT - PROBLEM SELECTOR PLAN 3
- ECG w/ NSR at 73 bpm, LAD, incomplete LBBB, small U-waves  - no reports of palpitations, chest pain, shortness of breath  - findings may be chronic in patient who is very active (ran 3 marathons and works out constantly)  - does not have a PMD  -will obtain Ct coronaries for calcium score - ECG w/ NSR at 73 bpm, LAD, incomplete LBBB, small U-waves  - no reports of palpitations, chest pain, shortness of breath  - findings may be chronic in patient who is very active (ran 3 marathons and works out constantly)  - does not have a PMD  -will obtain Ct coronaries for calcium score- case d/w Dr. Cotton will cancel CT coronaries need outpt f/u.

## 2018-08-28 NOTE — CONSULT NOTE ADULT - SUBJECTIVE AND OBJECTIVE BOX
Cardiology/Vascular Medicine Inpatient Consultation Note      HISTORY OF PRESENT ILLNESS:  Patient is a 78 year old man, with past history significant for Cataract surgery, presented to the ED secondary to sudden onset of difficulty coordinating the fingers.  Seen and evaluated at bedside; NAD.  Patient relates that while sitting at his computer typing, he had difficulty pressing a key while using the right hand.  Eventually pressed the key, but his finger movements were not long as fluid as at baseline.  Simultaneously, patient experienced a strange, unfamiliar and indescribable sensation at the nape of the neck and shoulder.  No headaches, blurred vision, lightheadedness, dizziness, palpitations, chest pain or any other associated signs or symptoms.  Immediately, patient concluded that he was suffering a stroke and decided to come to the ED for evaluation.  Per documentation, after assessment in the ED, patient was brought to a room and later demonstrated mild hyperreflexia and a Stroke Code was effectuated.  CT scan of the head was negative for any acute pathology.    Additional information includes history of labile blood pressure.  Normal range is  to 130 and DBP 60 to 8 to 70.  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  However, BP has been stable since penitentiary.  Today's elevation was driven by the presumed knowledge of experiencing a stroke, per patient.  Patient is very active; runs every morning in the park, has participated in 3 marathons thus far.  Maintains a healthy pesco-vegetarian, complemented by significant amounts of vitamins and herbal supplements.  No toxic habits.    On my exam, patient appears clinically stable without new neurologic complaints.  MRI brain shows multiple acute infarcts highly suggestive of an embolic process.        Allergies  penicillins (Unknown)    MEDICATIONS:  aspirin enteric coated 81 milliGRAM(s) Oral daily  clopidogrel Tablet 75 milliGRAM(s) Oral daily  enoxaparin Injectable 40 milliGRAM(s) SubCutaneous daily  docusate sodium 100 milliGRAM(s) Oral two times a day  atorvastatin 80 milliGRAM(s) Oral at bedtime    PAST MEDICAL & SURGICAL HISTORY:  Meniscal injury: ~ left (no surgery)  S/P cataract surgery      FAMILY HISTORY:  Family history of heart disease (Father): father (sudden death at age 56 years; heart disease presumed to be due to Rheumatic heart disease  Family history of mental disorder (Mother): mother ( at age 91 years)    SOCIAL HISTORY:    [place]    REVIEW OF SYSTEMS:  CONSTITUTIONAL: No fever, weight loss, or fatigue  EYES: No eye pain, visual disturbances, or discharge  RESPIRATORY: No cough, wheezing, chills or hemoptysis; No Shortness of Breath  CARDIOVASCULAR: No chest pain, palpitations, passing out, dizziness, or leg swelling  GASTROINTESTINAL: No abdominal or epigastric pain. No nausea, vomiting, or hematemesis; No diarrhea or constipation. No melena or hematochezia.  NEUROLOGICAL: No headaches, memory loss, loss of strength, numbness, or tremors  MUSCULOSKELETAL: No joint pain or swelling; No muscle, back, or extremity pain    [ ] All others negative	    PHYSICAL EXAM:  T(C): 36.8 (18 @ 06:20), Max: 36.9 (18 @ 14:43)  HR: 55 (18 @ 06:20) (55 - 61)  BP: 147/68 (18 @ 06:20) (135/78 - 149/85)  RR: 18 (18 @ 06:20) (18 - 18)  SpO2: 99% (18 @ 06:20) (95% - 99%)  Wt(kg): --  I&O's Summary    27 Aug 2018 07:01  -  28 Aug 2018 07:00  --------------------------------------------------------  IN: 0 mL / OUT: 600 mL / NET: -600 mL    Appearance: Normal	  HEENT:   Normal oral mucosa, PERRL, EOMI	  Lymphatic: No lymphadenopathy  Cardiovascular: Normal S1 S2, No JVD, No murmurs, No edema  Respiratory: Lungs clear to auscultation	  Psychiatry: Awake, alert  Gastrointestinal:  Soft, Non-tender, + BS	  Skin: No rashes, No ecchymoses, No cyanosis	  Neurologic: Non-focal  Extremities: Normal range of motion, No clubbing, cyanosis or edema  Vascular: Peripheral pulses palpable 2+ bilaterally      LABS:	 	                          14.2   7.32  )-----------( 186      ( 28 Aug 2018 06:55 )             41.9         144  |  108<H>  |  15  ----------------------------<  89  3.9   |  25  |  0.88      144  |  107  |  17  ----------------------------<  94  4.1   |  26  |  0.89    Ca    9.0      28 Aug 2018 06:55  Ca    9.1      27 Aug 2018 06:35  Mg     2.0           < from: Transthoracic Echocardiogram (18 @ 13:32) >    Patient name: ROE BENNETT  YOB: 1940   Age: 78 (M)   MR#: [id number]  Study Date: 2018  Location: [address] [place] [address] StudySonographer: Josh Felder  Study quality: Technically Difficult  Referring Physician: Bart Red MD  BloodPressure: 156/78 mmHg  Height: 182 cm  Weight: 72 kg  BSA: 1.9 m2  ------------------------------------------------------------------------  PROCEDURE: Transthoracic echocardiogram with 2-D, M-Mode  and complete spectral and color flow Doppler. Patient was  injected with 10 cc's of aerosolized saline.  Patient was injected with 10 cc's of aerosolized saline.  INDICATION: Cerebral infarction, unspecified (I63.9)  ------------------------------------------------------------------------  DIMENSIONS:  Dimensions:     Normal Values:  LA:     3.6 cm    2.0 - 4.0 cm  Ao:     3.5 cm    2.0 - 3.8 cm  SEPTUM: 0.8 cm    0.6 - 1.2 cm  PWT:    0.8 cm    0.6 - 1.1 cm  LVIDd:  4.8 cm    3.0 - 5.6 cm  LVIDs:  3.2 cm    1.8 - 4.0 cm  Derived Variables:  LVMI:66 g/m2  RWT: 0.33  Fractional short: 33 %  Ejection Fraction (Teicholtz): 62 %  ------------------------------------------------------------------------  OBSERVATIONS:  Mitral Valve: Mitral annular calcification, otherwise  normal mitral valve. Mildmitral regurgitation.  Aortic Root: Normal aortic root.  Aortic Valve: Calcified trileaflet aortic valve with normal  opening. Moderate aortic regurgitation.  Vena contracta  width about  0.4 cm.  Left Atrium: Normal left atrium.  Left Ventricle: Endocardium not well visualized; grossly  normal left ventricular systolic function. Normal left  ventricular internal dimensions and wall thicknesses.  Right Heart: Normal right atrium. The right ventricle is  not well visualized; grossly normal right ventricular  systolic function. Normal tricuspid valve. Mild tricuspid  regurgitation. Normal pulmonic valve.  Moderate pulmonic  regurgitation.  Pericardium/PleuraNormal pericardium with no pericardial  effusion.  Hemodynamic: Estimated right ventricular systolic pressure  equals 41 mm Hg, assuming right atrial pressure equals 10  mm Hg, consistent with mild pulmonary hypertension.  ------------------------------------------------------------------------  CONCLUSIONS:  1. Mitral annular calcification, otherwise normal mitral  valve. Mild mitral regurgitation.  2. Calcified trileaflet aortic valve with normal opening.  Moderate aortic regurgitation.  Vena contracta width about  0.4 cm.  3. Normal left ventricular internal dimensions and wall  thicknesses.  4. Endocardium not well visualized; grossly normal left  ventricular systolic function.  5. The right ventricle is not well visualized; grossly  normal right ventricular systolic function.  6. Estimated right ventricular systolic pressureequals 41  mm Hg, assuming right atrial pressure equals 10 mm Hg,  consistent with mild pulmonary hypertension.  7. A bubble study was performed with the intravenous  injection of agitated saline contrast.  Following contrast  injection, bubbles wereseen in the left heart.  This may  reflect the presence of an intracardiac shunt.  No obvious cardiac source of embolus was identified on this  transthoracic study.  If clinical suspicion is high,  consider CLAUDIA for further evaluation.  ------------------------------------------------------------------------  Confirmed on  2018 - 15:51:45 by Prabhu Steele M.D.  ------------------------------------------------------------------------    < end of copied text > Cardiology/Vascular Medicine Inpatient Consultation Note      HISTORY OF PRESENT ILLNESS:  Patient is a 78 year old man, with past history significant for Cataract surgery, presented to the ED secondary to sudden onset of difficulty coordinating the fingers.  Seen and evaluated at bedside; NAD.  Patient relates that while sitting at his computer typing, he had difficulty pressing a key while using the right hand.  Eventually pressed the key, but his finger movements were not long as fluid as at baseline.  Simultaneously, patient experienced a strange, unfamiliar and indescribable sensation at the nape of the neck and shoulder.  No headaches, blurred vision, lightheadedness, dizziness, palpitations, chest pain or any other associated signs or symptoms.  Immediately, patient concluded that he was suffering a stroke and decided to come to the ED for evaluation.  Per documentation, after assessment in the ED, patient was brought to a room and later demonstrated mild hyperreflexia and a Stroke Code was effectuated.  CT scan of the head was negative for any acute pathology.    Additional information includes history of labile blood pressure.  Normal range is  to 130 and DBP 60 to 8 to 70.  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  However, BP has been stable since detention.  Today's elevation was driven by the presumed knowledge of experiencing a stroke, per patient.  Patient is very active; runs every morning in the park, has participated in 3 marathons thus far.  Maintains a healthy pesco-vegetarian, complemented by significant amounts of vitamins and herbal supplements.  No toxic habits.    On my exam, patient appears clinically stable without new neurologic complaints.  MRI brain shows multiple acute infarcts highly suggestive of an embolic process. ECG shows SR with ILBBB (unknown how old).    Recommendations:  1. Agree with CLAUDIA --> if negative, plan for ILR.  2. LE venous duplex US  3. Recommend antiplatelet medications (patient currently on DAPT).  Refusing to take statin after lengthy discussion.  4. Eventual further cardiac evaluation in 1 month post stroke for abnormal EKG.  Can obtain carotid artery US at this time.        Allergies  penicillins (Unknown)    MEDICATIONS:  aspirin enteric coated 81 milliGRAM(s) Oral daily  clopidogrel Tablet 75 milliGRAM(s) Oral daily  enoxaparin Injectable 40 milliGRAM(s) SubCutaneous daily  docusate sodium 100 milliGRAM(s) Oral two times a day  atorvastatin 80 milliGRAM(s) Oral at bedtime    PAST MEDICAL & SURGICAL HISTORY:  Meniscal injury: ~ left (no surgery)  S/P cataract surgery      FAMILY HISTORY:  Family history of heart disease (Father): father (sudden death at age 56 years; heart disease presumed to be due to Rheumatic heart disease  Family history of mental disorder (Mother): mother ( at age 91 years)    SOCIAL HISTORY:    [place]    REVIEW OF SYSTEMS:  CONSTITUTIONAL: No fever, weight loss, or fatigue  EYES: No eye pain, visual disturbances, or discharge  RESPIRATORY: No cough, wheezing, chills or hemoptysis; No Shortness of Breath  CARDIOVASCULAR: No chest pain, palpitations, passing out, dizziness, or leg swelling  GASTROINTESTINAL: No abdominal or epigastric pain. No nausea, vomiting, or hematemesis; No diarrhea or constipation. No melena or hematochezia.  NEUROLOGICAL: No headaches, memory loss, loss of strength, numbness, or tremors  MUSCULOSKELETAL: No joint pain or swelling; No muscle, back, or extremity pain    [ ] All others negative	    PHYSICAL EXAM:  T(C): 36.8 (18 @ 06:20), Max: 36.9 (18 @ 14:43)  HR: 55 (18 @ 06:20) (55 - 61)  BP: 147/68 (18 @ 06:20) (135/78 - 149/85)  RR: 18 (18 @ 06:20) (18 - 18)  SpO2: 99% (18 @ 06:20) (95% - 99%)  Wt(kg): --  I&O's Summary    27 Aug 2018 07:01  -  28 Aug 2018 07:00  --------------------------------------------------------  IN: 0 mL / OUT: 600 mL / NET: -600 mL    Appearance: Normal	  HEENT:   Normal oral mucosa, PERRL, EOMI	  Lymphatic: No lymphadenopathy  Cardiovascular: Normal S1 S2, No JVD, No murmurs, No edema  Respiratory: Lungs clear to auscultation	  Psychiatry: Awake, alert  Gastrointestinal:  Soft, Non-tender, + BS	  Skin: No rashes, No ecchymoses, No cyanosis	  Neurologic: Non-focal  Extremities: Normal range of motion, No clubbing, cyanosis or edema  Vascular: Peripheral pulses palpable 2+ bilaterally      LABS:	 	                          14.2   7.32  )-----------( 186      ( 28 Aug 2018 06:55 )             41.9         144  |  108<H>  |  15  ----------------------------<  89  3.9   |  25  |  0.88      144  |  107  |  17  ----------------------------<  94  4.1   |  26  |  0.89    Ca    9.0      28 Aug 2018 06:55  Ca    9.1      27 Aug 2018 06:35  Mg     2.0           < from: Transthoracic Echocardiogram (18 @ 13:32) >    Patient name: ROE BENNETT  YOB: 1940   Age: 78 (M)   MR#: [id number]  Study Date: 2018  [facility] StudySonographer: Josh Felder  Study quality: Technically Difficult  Referring Physician: Bart Red MD  BloodPressure: 156/78 mmHg  Height: 182 cm  Weight: 72 kg  BSA: 1.9 m2  ------------------------------------------------------------------------  PROCEDURE: Transthoracic echocardiogram with 2-D, M-Mode  and complete spectral and color flow Doppler. Patient was  injected with 10 cc's of aerosolized saline.  Patient was injected with 10 cc's of aerosolized saline.  INDICATION: Cerebral infarction, unspecified (I63.9)  ------------------------------------------------------------------------  DIMENSIONS:  Dimensions:     Normal Values:  LA:     3.6 cm    2.0 - 4.0 cm  Ao:     3.5 cm    2.0 - 3.8 cm  SEPTUM: 0.8 cm    0.6 - 1.2 cm  PWT:    0.8 cm    0.6 - 1.1 cm  LVIDd:  4.8 cm    3.0 - 5.6 cm  LVIDs:  3.2 cm    1.8 - 4.0 cm  Derived Variables:  LVMI:66 g/m2  RWT: 0.33  Fractional short: 33 %  Ejection Fraction (Teicholtz): 62 %  ------------------------------------------------------------------------  OBSERVATIONS:  Mitral Valve: Mitral annular calcification, otherwise  normal mitral valve. Mildmitral regurgitation.  Aortic Root: Normal aortic root.  Aortic Valve: Calcified trileaflet aortic valve with normal  opening. Moderate aortic regurgitation.  Vena contracta  width about  0.4 cm.  Left Atrium: Normal left atrium.  Left Ventricle: Endocardium not well visualized; grossly  normal left ventricular systolic function. Normal left  ventricular internal dimensions and wall thicknesses.  Right Heart: Normal right atrium. The right ventricle is  not well visualized; grossly normal right ventricular  systolic function. Normal tricuspid valve. Mild tricuspid  regurgitation. Normal pulmonic valve.  Moderate pulmonic  regurgitation.  Pericardium/PleuraNormal pericardium with no pericardial  effusion.  Hemodynamic: Estimated right ventricular systolic pressure  equals 41 mm Hg, assuming right atrial pressure equals 10  mm Hg, consistent with mild pulmonary hypertension.  ------------------------------------------------------------------------  CONCLUSIONS:  1. Mitral annular calcification, otherwise normal mitral  valve. Mild mitral regurgitation.  2. Calcified trileaflet aortic valve with normal opening.  Moderate aortic regurgitation.  Vena contracta width about  0.4 cm.  3. Normal left ventricular internal dimensions and wall  thicknesses.  4. Endocardium not well visualized; grossly normal left  ventricular systolic function.  5. The right ventricle is not well visualized; grossly  normal right ventricular systolic function.  6. Estimated right ventricular systolic pressureequals 41  mm Hg, assuming right atrial pressure equals 10 mm Hg,  consistent with mild pulmonary hypertension.  7. A bubble study was performed with the intravenous  injection of agitated saline contrast.  Following contrast  injection, bubbles wereseen in the left heart.  This may  reflect the presence of an intracardiac shunt.  No obvious cardiac source of embolus was identified on this  transthoracic study.  If clinical suspicion is high,  consider CLAUDIA for further evaluation.  ------------------------------------------------------------------------  Confirmed on  2018 - 15:51:45 by Prabhu Steele M.D.  ------------------------------------------------------------------------        < end of copied text >

## 2018-08-28 NOTE — CONSULT NOTE ADULT - ASSESSMENT
On my exam, patient appears clinically stable without new neurologic complaints.  MRI brain shows multiple acute infarcts highly suggestive of an embolic process. ECG shows SR with ILBBB (unknown how old).    Recommendations:  1. Agree with CLAUDIA --> if negative, plan for ILR.  2. LE venous duplex US  3. Recommend antiplatelet medications (patient currently on DAPT).  Refusing to take statin after lengthy discussion.  4. Eventual further cardiac evaluation in 1 month post stroke for abnormal EKG.  Can obtain carotid artery US at this time.    Recommendations and plan reviewed with Brennen Hussein and Daniele.

## 2018-08-28 NOTE — PROGRESS NOTE ADULT - SUBJECTIVE AND OBJECTIVE BOX
[facility] NEUROLOGY FOLLOW UP NOTE  ROE BENNETT  Male  MRN-[id number]      Interval Hx: pt feels well today, no new episode of numbness, tingling, HA, n/v/, In good spirits.     PAST MEDICAL & SURGICAL HISTORY:  No pertinent past medical history  S/P cataract surgery, HTN    Social Hx: Lives with family. No toxic habits.   FAMILY HISTORY:  Family history of heart disease (Father): father (sudden death at age 56 years; heart disease presumed to be due to Rheumatic heart disease  Family history of mental disorder (Mother): mother ( at age 91 years)    Home Medications:  boswellia alexis: orally once a day (25 Aug 2018 22:12)  chlorpheniramine: orally once a day, As Needed (25 Aug 2018 20:11)  Ginkgo Biloba: orally once a day (25 Aug 2018 22:11)  Multiple Vitamins oral tablet: 1 tab(s) orally once a day (25 Aug 2018 22:13)  Vitamin B Complex 100:  (25 Aug 2018 22:08)  Vitamin B12:  (25 Aug 2018 22:08)  Vitamin C 1000 mg oral tablet: 6 tab(s) orally once a day (6 grams daily) (25 Aug 2018 22:08)  Vitamin D3 10,000 intl units oral capsule: 1 cap(s) orally once a day - during the summer months (25 Aug 2018 22:09)  Vitamin D3 1000 intl units oral tablet: 12 tab(s) orally once a day (12,000 IU daily during winter months) (25 Aug 2018 22:10)    MEDICATIONS  (STANDING):  aspirin enteric coated 81 milliGRAM(s) Oral daily  atorvastatin 80 milliGRAM(s) Oral at bedtime  clopidogrel Tablet 75 milliGRAM(s) Oral daily    MEDICATIONS  (PRN): none.    Allergies  No Known Allergies    Vital Signs Last 24 Hrs  T(C): 36.7 (27 Aug 2018 06:37), Max: 37.3 (26 Aug 2018 14:45)  T(F): 98.1 (27 Aug 2018 06:37), Max: 99.2 (26 Aug 2018 14:45)  HR: 58 (27 Aug 2018 06:37) (58 - 60)  BP: 156/78 (27 Aug 2018 06:37) (142/78 - 156/78)  BP(mean): --  RR: 18 (27 Aug 2018 06:37) (18 - 18)  SpO2: 97% (27 Aug 2018 06:37) (97% - 100%)    PHYSICAL EXAM:  Constitutional: no acute distress, well nourished, well developed   Musculoskeletal:  no clubbing or cyanosis of the fingernails, no joint swelling, no myalgia  Eyes:  normal sclera and conjunctiva and pupils, no JODIE, no APD. Fundi: no papilledema.  Neurologic:  - Mental Status:  Alert, awake, oriented to person, place, and time; Speech is fluent with intact naming, repetition, and comprehension;   - Cranial Nerves II-XII:    II:  VFF, Pupils are equal, round, and reactive to light.  III, IV, VI:  Extraocular movements are intact without nystagmus.  V:  Facial sensation is intact in the V1-V3 distribution bilaterally.  VII:  Face is symmetric with normal eye closure and smile  VIII:  Hearing is intact to finger rub.  IX, X:  Uvula is midline and soft palate rises symmetrically  XI:  Head turning and shoulder shrug are intact.  XII:  Tongue protrudes in the midline.  - Motor:  Strength is 5/5 throughout.  There is no pronator drift.  Normal muscle bulk and tone throughout.  - Reflexes:  2+ and symmetric at the biceps, triceps, brachioradialis, knees, and ankles.  Plantar responses flexor.  - Sensory:  Intact to light touch throughout.  - Coordination:  Finger-nose-finger and heel-knee-shin intact without dysmetria.  Rapid alternating hand movements mild difficulty  - Gait:   deferred                          14.2   7.32  )-----------( 186      ( 28 Aug 2018 06:55 )             41.9       144  |  108<H>  |  15  ----------------------------<  89  3.9   |  25  |  0.88    Ca    9.0      28 Aug 2018 06:55  Mg     2.0               RADIOLOGY & ADDITIONAL STUDIES:   < from: CT Brain Stroke Protocol (18 @ 10:56) >  IMPRESSION:    No acute intracranial hemorrhage, mass effect or midline shift.    The above findings were discussed with Dr. Mon by Dr. Deleon on 2018   10:55 AM, with read-back verification.    < end of copied text >     < from: CT Angio Neck w/ IV Cont (18 @ 10:56) >  IMPRESSION:     CTA NECK: No evidence of hemodynamically significant stenosis using   NASCET criteria. No evidence of arterial dissection.    CTA BRAIN: No major vessel occlusion or proximal stenosis.  Normal   anatomic variants as discussed in the body the report.    < end of copied text >    < from: MR Head No Cont (18 @ 15:54) >  Magnetic resonance imaging of the brain was carried out with transaxial   SPGR, FLAIR, fast spin echo T2 weighted images, axial susceptibility   weighted series, diffusion weighted series and sagittal T1 weighted   series on a 1.5 Ai magnet.    Comparison is made with the prior CT/CTA of earlier today.    Atrophy is identified with ventricular and sulcal prominence.    There are multiple tiny scattered infarcts on diffusion-weighted imaging   in the left centrum semiovale ovale, adjacent to the left lateral   ventricle, and the left parietal white matter and in the right occipital   region. Additionally there are a few scattered apparent areas of   restriction in the bridgette bilaterally. These are suspicious for embolic   infarcts in view of the multiple vascular territory involvement. Small   vessel white matter ischemic changes are noted.    There is no acute hemorrhage.    Sellar and parasellar structures are unremarkable.    < end of copied text >    < from: Transthoracic Echocardiogram (18 @ 13:32) >  CONCLUSIONS:  1. Mitral annular calcification, otherwise normal mitral  valve. Mild mitral regurgitation.  2. Calcified trileaflet aortic valve with normal opening.  Moderate aortic regurgitation.  Vena contracta width about  0.4 cm.  3. Normal left ventricular internal dimensions and wall  thicknesses.  4. Endocardium not well visualized; grossly normal left  ventricular systolic function.  5. The right ventricle is not well visualized; grossly  normal right ventricular systolic function.  6. Estimated right ventricular systolic pressureequals 41  mm Hg, assuming right atrial pressure equals 10 mm Hg,  consistent with mild pulmonary hypertension.  7. A bubble study was performed with the intravenous  injection of agitated saline contrast.  Following contrast  injection, bubbles wereseen in the left heart.  This may  reflect the presence of an intracardiac shunt.  No obvious cardiac source of embolus was identified on this  transthoracic study.  If clinical suspicion is high,  consider CLAUDIA for further evaluation.  ------------------------------------------------------------------------  Confirmed on  2018 - 15:51:45 by Prabhu Steele M.D.  ------------------------------------------------------------------------    < end of copied text >    ----------------------------------------------------    Assessment:  79 y/o M nonsmoker PMHx of likely undiagnosed HTN, cataract surgery initially p/w difficulty coordinating fingers that occurred around 7am on  which resolved on presentation to hospital, found to have evidence of multiple scattered infarcts in both hemispheres, likely Embolic stroke of undetermined source (ESUS). Admitted for further workup.  Pt is strict vegan, takes multiple supplements, not on any home medications and does not follow with any physician.  A1c acceptable. Was refusing meds since admission including DAPT and DVT PPx but was able to be convinced yesterday when presented with research evidence. Refusing Statin so far    Plan:  - c/w tele  - TTE evidence of PFO  - Given above, CLAUDIA recommended  - LEs Venous Doppler   - c/w DAPT  - Pt refusing statin, as he wants to research about it. SPARCL study NE article was printed out and given for patient to review, pt was appreciative.  - f/u cardio for loop recorder  - BCx negative so far, afebrile  - Kindly contact us if there is a change in patient's condition or for any questions.    Thank you for involving us in the care of this patient. [facility] NEUROLOGY FOLLOW UP NOTE  ROE BENNETT  Male  MRN-[id number]      Interval Hx: pt feels well today, no new episode of numbness, tingling, HA, n/v/, In good spirits.     PAST MEDICAL & SURGICAL HISTORY:  No pertinent past medical history  S/P cataract surgery, HTN    Social Hx: Lives with family. No toxic habits.   FAMILY HISTORY:  Family history of heart disease (Father): father (sudden death at age 56 years; heart disease presumed to be due to Rheumatic heart disease  Family history of mental disorder (Mother): mother ( at age 91 years)    Home Medications:  boswellia alexis: orally once a day (25 Aug 2018 22:12)  chlorpheniramine: orally once a day, As Needed (25 Aug 2018 20:11)  Ginkgo Biloba: orally once a day (25 Aug 2018 22:11)  Multiple Vitamins oral tablet: 1 tab(s) orally once a day (25 Aug 2018 22:13)  Vitamin B Complex 100:  (25 Aug 2018 22:08)  Vitamin B12:  (25 Aug 2018 22:08)  Vitamin C 1000 mg oral tablet: 6 tab(s) orally once a day (6 grams daily) (25 Aug 2018 22:08)  Vitamin D3 10,000 intl units oral capsule: 1 cap(s) orally once a day - during the summer months (25 Aug 2018 22:09)  Vitamin D3 1000 intl units oral tablet: 12 tab(s) orally once a day (12,000 IU daily during winter months) (25 Aug 2018 22:10)    MEDICATIONS  (STANDING):  aspirin enteric coated 81 milliGRAM(s) Oral daily  atorvastatin 80 milliGRAM(s) Oral at bedtime  clopidogrel Tablet 75 milliGRAM(s) Oral daily    MEDICATIONS  (PRN): none.    Allergies  No Known Allergies    Vital Signs Last 24 Hrs  T(C): 36.7 (27 Aug 2018 06:37), Max: 37.3 (26 Aug 2018 14:45)  T(F): 98.1 (27 Aug 2018 06:37), Max: 99.2 (26 Aug 2018 14:45)  HR: 58 (27 Aug 2018 06:37) (58 - 60)  BP: 156/78 (27 Aug 2018 06:37) (142/78 - 156/78)  BP(mean): --  RR: 18 (27 Aug 2018 06:37) (18 - 18)  SpO2: 97% (27 Aug 2018 06:37) (97% - 100%)    PHYSICAL EXAM:  Constitutional: no acute distress, well nourished, well developed   Musculoskeletal:  no clubbing or cyanosis of the fingernails, no joint swelling, no myalgia  Eyes:  normal sclera and conjunctiva and pupils, no JODIE, no APD. Fundi: no papilledema.  Neurologic:  - Mental Status:  Alert, awake, oriented to person, place, and time; Speech is fluent with intact naming, repetition, and comprehension;   - Cranial Nerves II-XII:    II:  VFF, Pupils are equal, round, and reactive to light.  III, IV, VI:  Extraocular movements are intact without nystagmus.  V:  Facial sensation is intact in the V1-V3 distribution bilaterally.  VII:  Face is symmetric with normal eye closure and smile  VIII:  Hearing is intact to finger rub.  IX, X:  Uvula is midline and soft palate rises symmetrically  XI:  Head turning and shoulder shrug are intact.  XII:  Tongue protrudes in the midline.  - Motor:  Strength is 5/5 throughout.  There is no pronator drift.  Normal muscle bulk and tone throughout.  - Reflexes:  2+ and symmetric at the biceps, triceps, brachioradialis, knees, and ankles.  Plantar responses flexor.  - Sensory:  Intact to light touch throughout.  - Coordination:  Finger-nose-finger and heel-knee-shin intact without dysmetria.  Rapid alternating hand movements mild difficulty  - Gait:   deferred                          14.2   7.32  )-----------( 186      ( 28 Aug 2018 06:55 )             41.9       144  |  108<H>  |  15  ----------------------------<  89  3.9   |  25  |  0.88    Ca    9.0      28 Aug 2018 06:55  Mg     2.0               RADIOLOGY & ADDITIONAL STUDIES:   < from: CT Brain Stroke Protocol (18 @ 10:56) >  IMPRESSION:    No acute intracranial hemorrhage, mass effect or midline shift.    The above findings were discussed with Dr. Mon by Dr. Deleon on 2018   10:55 AM, with read-back verification.    < end of copied text >     < from: CT Angio Neck w/ IV Cont (18 @ 10:56) >  IMPRESSION:     CTA NECK: No evidence of hemodynamically significant stenosis using   NASCET criteria. No evidence of arterial dissection.    CTA BRAIN: No major vessel occlusion or proximal stenosis.  Normal   anatomic variants as discussed in the body the report.    < end of copied text >    < from: MR Head No Cont (18 @ 15:54) >  Magnetic resonance imaging of the brain was carried out with transaxial   SPGR, FLAIR, fast spin echo T2 weighted images, axial susceptibility   weighted series, diffusion weighted series and sagittal T1 weighted   series on a 1.5 Ai magnet.    Comparison is made with the prior CT/CTA of earlier today.    Atrophy is identified with ventricular and sulcal prominence.    There are multiple tiny scattered infarcts on diffusion-weighted imaging   in the left centrum semiovale ovale, adjacent to the left lateral   ventricle, and the left parietal white matter and in the right occipital   region. Additionally there are a few scattered apparent areas of   restriction in the bridgette bilaterally. These are suspicious for embolic   infarcts in view of the multiple vascular territory involvement. Small   vessel white matter ischemic changes are noted.    There is no acute hemorrhage.    Sellar and parasellar structures are unremarkable.    < end of copied text >    < from: Transthoracic Echocardiogram (18 @ 13:32) >  CONCLUSIONS:  1. Mitral annular calcification, otherwise normal mitral  valve. Mild mitral regurgitation.  2. Calcified trileaflet aortic valve with normal opening.  Moderate aortic regurgitation.  Vena contracta width about  0.4 cm.  3. Normal left ventricular internal dimensions and wall  thicknesses.  4. Endocardium not well visualized; grossly normal left  ventricular systolic function.  5. The right ventricle is not well visualized; grossly  normal right ventricular systolic function.  6. Estimated right ventricular systolic pressureequals 41  mm Hg, assuming right atrial pressure equals 10 mm Hg,  consistent with mild pulmonary hypertension.  7. A bubble study was performed with the intravenous  injection of agitated saline contrast.  Following contrast  injection, bubbles wereseen in the left heart.  This may  reflect the presence of an intracardiac shunt.  No obvious cardiac source of embolus was identified on this  transthoracic study.  If clinical suspicion is high,  consider CLAUDIA for further evaluation.  ------------------------------------------------------------------------  Confirmed on  2018 - 15:51:45 by Prabhu Steele M.D.  ------------------------------------------------------------------------    < end of copied text >    ----------------------------------------------------    Assessment:  78 years old man with vascular risk factors of age and ? HTN (denies regular medical followup) is evaluated at [facility] for an acute onset of right-hand weakness/clumsiness. In the morning of  around 7:30-7:40 AM, he reports to have noticed acute onset of sensory paresthesia or right arm and shoulder with complete resolution subsequently. MRI brain showed acute/subacute small punctate embolic-looking infarcts involving multiple vascular distribution including bilateral MCAs, left VALERIE and basilar artery. CTA head and neck is grossly unremarkable. TTE did not show any obvious structural cardiac source of embolism, but showed possible right to left shunt.    Impression:  Cerebral embolism with cerebral infarction. Stroke involving multiple vascular distributions including bilateral MCAs, left VALERIE and posterior circulation - likely etiology being cryptogenic stroke, probably related to embolism from a proximal source like cardiac/paradoxical source of embolism    Plan:  - Aspirin and clopidogrel for aggressive secondary stroke prevention for 3 weeks followed by aspirin. Risks versus benefits, and adverse reactions/complications associated with medication use were discussed with him and his wife at bedside in detail. Importance of medication compliance was reemphasized  - Agree with pharmacological DVT prophylaxis   - Atorvastatin 80 mg at bedtime considering admission. LDL. Risk versus benefits, and adverse reactions associated with medication use, were discussed with him in detail, but he adamantly refused to be started on any statin medications due to "personal reasons"  - HbA1C 5.0, continue with aggressive vascular risk factors modifications   - BP goals with gradual normotension  - Awaiting CLAUDIA with bubble study and continue with telemetry to screen for possible cardiac source of embolism  - Prolonged cardiac monitoring with ICM to screen for occult cardiac arrhythmia like atrial fibrillation being the cause of possible cardiac source of embolism to be considered based on results of above mentioned cardiac tests    - PT/OT/Speech and swallow/safety eval  - Stroke education    Above mentioned plan was discussed with patient in detail. All the questions were answered and concerns were addressed.

## 2018-08-28 NOTE — CHART NOTE - NSCHARTNOTEFT_GEN_A_CORE
ELECTROPHYSIOLOGY    Patient feeling well.  No complaints of chest pain, shortness of breath, palpitations or lightheadedness.   NPO for CLAUDIA this afternoon.    Also scheduled and consented for ILR.  All questions answered.

## 2018-08-28 NOTE — PROGRESS NOTE ADULT - SUBJECTIVE AND OBJECTIVE BOX
Patient is a 78y old  Male who presents with a chief complaint of Cerebrovascular Accident (25 Aug 2018 21:01)      SUBJECTIVE / OVERNIGHT EVENTS: pt agreeable to take meds after discussion with neuro    MEDICATIONS  (STANDING):  aspirin enteric coated 81 milliGRAM(s) Oral daily  atorvastatin 80 milliGRAM(s) Oral at bedtime  clopidogrel Tablet 75 milliGRAM(s) Oral daily  docusate sodium 100 milliGRAM(s) Oral two times a day  enoxaparin Injectable 40 milliGRAM(s) SubCutaneous daily    MEDICATIONS  (PRN):        CAPILLARY BLOOD GLUCOSE        I&O's Summary    27 Aug 2018 07:01  -  28 Aug 2018 07:00  --------------------------------------------------------  IN: 0 mL / OUT: 600 mL / NET: -600 mL        T(C): 36.8 (08-28-18 @ 06:20), Max: 36.9 (08-27-18 @ 14:43)  HR: 55 (08-28-18 @ 06:20) (55 - 61)  BP: 147/68 (08-28-18 @ 06:20) (135/78 - 149/85)  RR: 18 (08-28-18 @ 06:20) (18 - 18)  SpO2: 99% (08-28-18 @ 06:20) (95% - 99%)    PHYSICAL EXAM:  GENERAL: NAD, well-developed  HEAD:  Atraumatic, Normocephalic  EYES: EOMI, PERRLA, conjunctiva and sclera clear  NECK: Supple, No JVD  CHEST/LUNG: Clear to auscultation bilaterally; No wheeze  HEART: Regular rate and rhythm; No murmurs, rubs, or gallops  ABDOMEN: Soft, Nontender, Nondistended; Bowel sounds present  EXTREMITIES:  2+ Peripheral Pulses, No clubbing, cyanosis, or edema  PSYCH: AAOx3  NEUROLOGY: non-focal  SKIN: No rashes or lesions    LABS:                        14.2   7.32  )-----------( 186      ( 28 Aug 2018 06:55 )             41.9     08-28    144  |  108<H>  |  15  ----------------------------<  89  3.9   |  25  |  0.88    Ca    9.0      28 Aug 2018 06:55  Mg     2.0     08-28                  RADIOLOGY & ADDITIONAL TESTS:    Imaging Personally Reviewed:< from: Transthoracic Echocardiogram (08.27.18 @ 13:32) >  Patient name: ROE BENNETT  YOB: 1940   Age: 78 (M)   MR#: [id number]  Study Date: 8/27/2018  Location: [address] [place] [address] StudySonographer: Josh Felder  Study quality: Technically Difficult  Referring Physician: Bart Red MD  BloodPressure: 156/78 mmHg  Height: 182 cm  Weight: 72 kg  BSA: 1.9 m2  ------------------------------------------------------------------------  PROCEDURE: Transthoracic echocardiogram with 2-D, M-Mode  and complete spectral and color flow Doppler. Patient was  injected with 10 cc's of aerosolized saline.  Patient was injected with 10 cc's of aerosolized saline.  INDICATION: Cerebral infarction, unspecified (I63.9)  ------------------------------------------------------------------------  DIMENSIONS:  Dimensions:     Normal Values:  LA:     3.6 cm    2.0 - 4.0 cm  Ao:     3.5 cm    2.0 - 3.8 cm  SEPTUM: 0.8 cm    0.6 - 1.2 cm  PWT:    0.8 cm    0.6 - 1.1 cm  LVIDd:  4.8 cm    3.0 - 5.6 cm  LVIDs:  3.2 cm    1.8 - 4.0 cm  Derived Variables:  LVMI:66 g/m2  RWT: 0.33  Fractional short: 33 %  Ejection Fraction (Teicholtz): 62 %  ------------------------------------------------------------------------  OBSERVATIONS:  Mitral Valve: Mitral annular calcification, otherwise  normal mitral valve. Mildmitral regurgitation.  Aortic Root: Normal aortic root.  Aortic Valve: Calcified trileaflet aortic valve with normal  opening. Moderate aortic regurgitation.  Vena contracta  width about  0.4 cm.  Left Atrium: Normal left atrium.  Left Ventricle: Endocardium not well visualized; grossly  normal left ventricular systolic function. Normal left  ventricular internal dimensions and wall thicknesses.  Right Heart: Normal right atrium. The right ventricle is  not well visualized; grossly normal right ventricular  systolic function. Normal tricuspid valve. Mild tricuspid  regurgitation. Normal pulmonic valve.  Moderate pulmonic  regurgitation.  Pericardium/PleuraNormal pericardium with no pericardial  effusion.  Hemodynamic: Estimated right ventricular systolic pressure  equals 41 mm Hg, assuming right atrial pressure equals 10  mm Hg, consistent with mild pulmonary hypertension.  ------------------------------------------------------------------------  CONCLUSIONS:  1. Mitral annular calcification, otherwise normal mitral  valve. Mild mitral regurgitation.  2. Calcified trileaflet aortic valve with normal opening.  Moderate aortic regurgitation.  Vena contracta width about  0.4 cm.  3. Normal left ventricular internal dimensions and wall  thicknesses.  4. Endocardium not well visualized; grossly normal left  ventricular systolic function.  5. The right ventricle is not well visualized; grossly  normal right ventricular systolic function.  6. Estimated right ventricular systolic pressureequals 41  mm Hg, assuming right atrial pressure equals 10 mm Hg,  consistent with mild pulmonary hypertension.  7. A bubble study was performed with the intravenous  injection of agitated saline contrast.  Following contrast  injection, bubbles wereseen in the left heart.  This may  reflect the presence of an intracardiac shunt.  No obvious cardiac source of embolus was identified on this  transthoracic study.  If clinical suspicion is high,  consider CLAUDIA for further evaluation.    < end of copied text >      Consultant(s) Notes Reviewed:      Care Discussed with Consultants/Other Providers: Patient is a 78y old  Male who presents with a chief complaint of Cerebrovascular Accident (25 Aug 2018 21:01)      SUBJECTIVE / OVERNIGHT EVENTS: pt agreeable to take meds after discussion with neuro no further weakness or symptoms. Tele NSR no acute events    MEDICATIONS  (STANDING):  aspirin enteric coated 81 milliGRAM(s) Oral daily  atorvastatin 80 milliGRAM(s) Oral at bedtime  clopidogrel Tablet 75 milliGRAM(s) Oral daily  docusate sodium 100 milliGRAM(s) Oral two times a day  enoxaparin Injectable 40 milliGRAM(s) SubCutaneous daily    MEDICATIONS  (PRN):        CAPILLARY BLOOD GLUCOSE        I&O's Summary    27 Aug 2018 07:01  -  28 Aug 2018 07:00  --------------------------------------------------------  IN: 0 mL / OUT: 600 mL / NET: -600 mL        T(C): 36.8 (08-28-18 @ 06:20), Max: 36.9 (08-27-18 @ 14:43)  HR: 55 (08-28-18 @ 06:20) (55 - 61)  BP: 147/68 (08-28-18 @ 06:20) (135/78 - 149/85)  RR: 18 (08-28-18 @ 06:20) (18 - 18)  SpO2: 99% (08-28-18 @ 06:20) (95% - 99%)    PHYSICAL EXAM:  GENERAL: NAD, well-developed  HEAD:  Atraumatic, Normocephalic  EYES: EOMI, PERRLA, conjunctiva and sclera clear  NECK: Supple, No JVD  CHEST/LUNG: Clear to auscultation bilaterally; No wheeze  HEART: Regular rate and rhythm; No murmurs, rubs, or gallops  ABDOMEN: Soft, Nontender, Nondistended; Bowel sounds present  EXTREMITIES:  2+ Peripheral Pulses, No clubbing, cyanosis, or edema  PSYCH: AAOx3  NEUROLOGY: non-focal  SKIN: No rashes or lesions    LABS:                        14.2   7.32  )-----------( 186      ( 28 Aug 2018 06:55 )             41.9     08-28    144  |  108<H>  |  15  ----------------------------<  89  3.9   |  25  |  0.88    Ca    9.0      28 Aug 2018 06:55  Mg     2.0     08-28                  RADIOLOGY & ADDITIONAL TESTS:    Imaging Personally Reviewed:< from: Transthoracic Echocardiogram (08.27.18 @ 13:32) >  Patient name: ROE BENNETT  YOB: 1940   Age: 78 (M)   MR#: [id number]  Study Date: 8/27/2018  [facility] Bubble StudySonographer: Josh Felder  Study quality: Technically Difficult  Referring Physician: Bart Red MD  BloodPressure: 156/78 mmHg  Height: 182 cm  Weight: 72 kg  BSA: 1.9 m2  ------------------------------------------------------------------------  PROCEDURE: Transthoracic echocardiogram with 2-D, M-Mode  and complete spectral and color flow Doppler. Patient was  injected with 10 cc's of aerosolized saline.  Patient was injected with 10 cc's of aerosolized saline.  INDICATION: Cerebral infarction, unspecified (I63.9)  ------------------------------------------------------------------------  DIMENSIONS:  Dimensions:     Normal Values:  LA:     3.6 cm    2.0 - 4.0 cm  Ao:     3.5 cm    2.0 - 3.8 cm  SEPTUM: 0.8 cm    0.6 - 1.2 cm  PWT:    0.8 cm    0.6 - 1.1 cm  LVIDd:  4.8 cm    3.0 - 5.6 cm  LVIDs:  3.2 cm    1.8 - 4.0 cm  Derived Variables:  LVMI:66 g/m2  RWT: 0.33  Fractional short: 33 %  Ejection Fraction (Teicholtz): 62 %  ------------------------------------------------------------------------  OBSERVATIONS:  Mitral Valve: Mitral annular calcification, otherwise  normal mitral valve. Mildmitral regurgitation.  Aortic Root: Normal aortic root.  Aortic Valve: Calcified trileaflet aortic valve with normal  opening. Moderate aortic regurgitation.  Vena contracta  width about  0.4 cm.  Left Atrium: Normal left atrium.  Left Ventricle: Endocardium not well visualized; grossly  normal left ventricular systolic function. Normal left  ventricular internal dimensions and wall thicknesses.  Right Heart: Normal right atrium. The right ventricle is  not well visualized; grossly normal right ventricular  systolic function. Normal tricuspid valve. Mild tricuspid  regurgitation. Normal pulmonic valve.  Moderate pulmonic  regurgitation.  Pericardium/PleuraNormal pericardium with no pericardial  effusion.  Hemodynamic: Estimated right ventricular systolic pressure  equals 41 mm Hg, assuming right atrial pressure equals 10  mm Hg, consistent with mild pulmonary hypertension.  ------------------------------------------------------------------------  CONCLUSIONS:  1. Mitral annular calcification, otherwise normal mitral  valve. Mild mitral regurgitation.  2. Calcified trileaflet aortic valve with normal opening.  Moderate aortic regurgitation.  Vena contracta width about  0.4 cm.  3. Normal left ventricular internal dimensions and wall  thicknesses.  4. Endocardium not well visualized; grossly normal left  ventricular systolic function.  5. The right ventricle is not well visualized; grossly  normal right ventricular systolic function.  6. Estimated right ventricular systolic pressureequals 41  mm Hg, assuming right atrial pressure equals 10 mm Hg,  consistent with mild pulmonary hypertension.  7. A bubble study was performed with the intravenous  injection of agitated saline contrast.  Following contrast  injection, bubbles wereseen in the left heart.  This may  reflect the presence of an intracardiac shunt.  No obvious cardiac source of embolus was identified on this  transthoracic study.  If clinical suspicion is high,  consider CLAUDIA for further evaluation.    < end of copied text >      Consultant(s) Notes Reviewed:      Care Discussed with Consultants/Other Providers:

## 2018-08-28 NOTE — PROGRESS NOTE ADULT - PROBLEM SELECTOR PLAN 1
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 w/o hemorrhage  - ASA/Plavix x3 weeks  -statin  -TTE-bubble study positive w/ possibility of PFO check LE duplex   -CLAUDIA-PFO r/o intracardiac thrombus  - EP consult for Loop  - neurological checks  - telemetry monitoring  - passed swallow eval  -  PT/OT eval-home OT  - initially refusing DVT prophyalxis explained to pt its standard of care for CVA agreeable on lovenox 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

## 2018-08-28 NOTE — CHART NOTE - NSCHARTNOTEFT_GEN_A_CORE
Type of Procedure: ILR implant  Licensed independent practitioner: Buzz Ramos MD  Assistant: None  Description of procedure: After CLAUDIA and anesthesia sedation, the LCW 4th intercostal space was prepped for ILR implant. A MDT ILR was implant and R wave sensing was 0.2mV.  Findings of procedure: As above  Estimated blood loss: None  Specimen removed: N/A  Preoperative Dx: CVA  Postoperative Dx: CVA  Complications: None  Anesthesia type: Conscious

## 2018-08-29 ENCOUNTER — TRANSCRIPTION ENCOUNTER (OUTPATIENT)
Age: 78
End: 2018-08-29

## 2018-08-29 VITALS
OXYGEN SATURATION: 96 % | RESPIRATION RATE: 18 BRPM | SYSTOLIC BLOOD PRESSURE: 153 MMHG | TEMPERATURE: 98 F | HEART RATE: 62 BPM | DIASTOLIC BLOOD PRESSURE: 78 MMHG

## 2018-08-29 LAB
BUN SERPL-MCNC: 19 MG/DL — SIGNIFICANT CHANGE UP (ref 7–23)
CALCIUM SERPL-MCNC: 8.8 MG/DL — SIGNIFICANT CHANGE UP (ref 8.4–10.5)
CHLORIDE SERPL-SCNC: 107 MMOL/L — SIGNIFICANT CHANGE UP (ref 98–107)
CO2 SERPL-SCNC: 23 MMOL/L — SIGNIFICANT CHANGE UP (ref 22–31)
CREAT SERPL-MCNC: 1.02 MG/DL — SIGNIFICANT CHANGE UP (ref 0.5–1.3)
GLUCOSE SERPL-MCNC: 88 MG/DL — SIGNIFICANT CHANGE UP (ref 70–99)
HCT VFR BLD CALC: 40.9 % — SIGNIFICANT CHANGE UP (ref 39–50)
HGB BLD-MCNC: 13.7 G/DL — SIGNIFICANT CHANGE UP (ref 13–17)
MAGNESIUM SERPL-MCNC: 2.1 MG/DL — SIGNIFICANT CHANGE UP (ref 1.6–2.6)
MCHC RBC-ENTMCNC: 32.2 PG — SIGNIFICANT CHANGE UP (ref 27–34)
MCHC RBC-ENTMCNC: 33.5 % — SIGNIFICANT CHANGE UP (ref 32–36)
MCV RBC AUTO: 96 FL — SIGNIFICANT CHANGE UP (ref 80–100)
NRBC # FLD: 0 — SIGNIFICANT CHANGE UP
PLATELET # BLD AUTO: 184 K/UL — SIGNIFICANT CHANGE UP (ref 150–400)
PMV BLD: 11.4 FL — SIGNIFICANT CHANGE UP (ref 7–13)
POTASSIUM SERPL-MCNC: 4.1 MMOL/L — SIGNIFICANT CHANGE UP (ref 3.5–5.3)
POTASSIUM SERPL-SCNC: 4.1 MMOL/L — SIGNIFICANT CHANGE UP (ref 3.5–5.3)
RBC # BLD: 4.26 M/UL — SIGNIFICANT CHANGE UP (ref 4.2–5.8)
RBC # FLD: 12.2 % — SIGNIFICANT CHANGE UP (ref 10.3–14.5)
SODIUM SERPL-SCNC: 142 MMOL/L — SIGNIFICANT CHANGE UP (ref 135–145)
WBC # BLD: 9.66 K/UL — SIGNIFICANT CHANGE UP (ref 3.8–10.5)
WBC # FLD AUTO: 9.66 K/UL — SIGNIFICANT CHANGE UP (ref 3.8–10.5)

## 2018-08-29 PROCEDURE — 93880 EXTRACRANIAL BILAT STUDY: CPT | Mod: 26

## 2018-08-29 PROCEDURE — 99239 HOSP IP/OBS DSCHRG MGMT >30: CPT

## 2018-08-29 PROCEDURE — 93970 EXTREMITY STUDY: CPT | Mod: 26

## 2018-08-29 PROCEDURE — 99233 SBSQ HOSP IP/OBS HIGH 50: CPT | Mod: GC

## 2018-08-29 RX ORDER — CHLORPHENIRAMINE MALEATE 4 MG
0 TABLET ORAL
Qty: 0 | Refills: 0 | COMMUNITY

## 2018-08-29 RX ORDER — CLOPIDOGREL BISULFATE 75 MG/1
1 TABLET, FILM COATED ORAL
Qty: 21 | Refills: 0
Start: 2018-08-29 | End: 2018-09-18

## 2018-08-29 RX ORDER — CHOLECALCIFEROL (VITAMIN D3) 125 MCG
1 CAPSULE ORAL
Qty: 0 | Refills: 0 | COMMUNITY

## 2018-08-29 RX ORDER — PREGABALIN 225 MG/1
0 CAPSULE ORAL
Qty: 0 | Refills: 0 | COMMUNITY

## 2018-08-29 RX ORDER — ATORVASTATIN CALCIUM 80 MG/1
1 TABLET, FILM COATED ORAL
Qty: 30 | Refills: 0
Start: 2018-08-29 | End: 2018-09-27

## 2018-08-29 RX ORDER — ASCORBIC ACID 60 MG
6 TABLET,CHEWABLE ORAL
Qty: 0 | Refills: 0 | COMMUNITY

## 2018-08-29 RX ORDER — ASPIRIN/CALCIUM CARB/MAGNESIUM 324 MG
1 TABLET ORAL
Qty: 30 | Refills: 0
Start: 2018-08-29 | End: 2018-09-27

## 2018-08-29 RX ORDER — CHOLECALCIFEROL (VITAMIN D3) 125 MCG
12 CAPSULE ORAL
Qty: 0 | Refills: 0 | COMMUNITY

## 2018-08-29 RX ADMIN — ENOXAPARIN SODIUM 40 MILLIGRAM(S): 100 INJECTION SUBCUTANEOUS at 11:34

## 2018-08-29 RX ADMIN — CLOPIDOGREL BISULFATE 75 MILLIGRAM(S): 75 TABLET, FILM COATED ORAL at 11:34

## 2018-08-29 RX ADMIN — Medication 81 MILLIGRAM(S): at 11:34

## 2018-08-29 NOTE — PROGRESS NOTE ADULT - NSHPATTENDINGPLANDISCUSS_GEN_ALL_CORE
Tele PA and patient
Neurology resident
Tele PA

## 2018-08-29 NOTE — PROGRESS NOTE ADULT - SUBJECTIVE AND OBJECTIVE BOX
[facility] NEUROLOGY FOLLOW UP NOTE  ROE BENNETT  Male  MRN-[id number]      Interval Hx: pt feels well today, no new episode of numbness, tingling, HA, n/v/, In good spirits.  S/p CLAUDIA and loop recorder placement. Eager to go home    PAST MEDICAL & SURGICAL HISTORY:  No pertinent past medical history  S/P cataract surgery, HTN    Social Hx: Lives with family. No toxic habits.   FAMILY HISTORY:  Family history of heart disease (Father): father (sudden death at age 56 years; heart disease presumed to be due to Rheumatic heart disease  Family history of mental disorder (Mother): mother ( at age 91 years)    Home Medications:  boswellia alexis: orally once a day (25 Aug 2018 22:12)  chlorpheniramine: orally once a day, As Needed (25 Aug 2018 20:11)  Ginkgo Biloba: orally once a day (25 Aug 2018 22:11)  Multiple Vitamins oral tablet: 1 tab(s) orally once a day (25 Aug 2018 22:13)  Vitamin B Complex 100:  (25 Aug 2018 22:08)  Vitamin B12:  (25 Aug 2018 22:08)  Vitamin C 1000 mg oral tablet: 6 tab(s) orally once a day (6 grams daily) (25 Aug 2018 22:08)  Vitamin D3 10,000 intl units oral capsule: 1 cap(s) orally once a day - during the summer months (25 Aug 2018 22:09)  Vitamin D3 1000 intl units oral tablet: 12 tab(s) orally once a day (12,000 IU daily during winter months) (25 Aug 2018 22:10)    MEDICATIONS  (STANDING):  aspirin enteric coated 81 milliGRAM(s) Oral daily  atorvastatin 80 milliGRAM(s) Oral at bedtime  clopidogrel Tablet 75 milliGRAM(s) Oral daily    MEDICATIONS  (PRN): none.    Allergies  No Known Allergies    Vital Signs Last 24 Hrs  T(C): 36.8 (29 Aug 2018 12:21), Max: 36.8 (28 Aug 2018 21:35)  T(F): 98.3 (29 Aug 2018 12:21), Max: 98.3 (28 Aug 2018 21:35)  HR: 62 (29 Aug 2018 12:21) (62 - 79)  BP: 153/78 (29 Aug 2018 12:21) (128/73 - 153/78)  BP(mean): --  RR: 18 (29 Aug 2018 12:21) (18 - 18)  SpO2: 96% (29 Aug 2018 12:21) (96% - 100%)    PHYSICAL EXAM:  Constitutional: no acute distress, well nourished, well developed   Musculoskeletal:  no clubbing or cyanosis of the fingernails, no joint swelling, no myalgia  Eyes:  normal sclera and conjunctiva and pupils, no JODIE, no APD. Fundi: no papilledema.  Neurologic:  - Mental Status:  Alert, awake, oriented to person, place, and time; Speech is fluent with intact naming, repetition, and comprehension;   - Cranial Nerves II-XII:    II:  VFF, Pupils are equal, round, and reactive to light.  III, IV, VI:  Extraocular movements are intact without nystagmus.  V:  Facial sensation is intact in the V1-V3 distribution bilaterally.  VII:  Face is symmetric with normal eye closure and smile  VIII:  Hearing is intact to finger rub.  IX, X:  Uvula is midline and soft palate rises symmetrically  XI:  Head turning and shoulder shrug are intact.  XII:  Tongue protrudes in the midline.  - Motor:  Strength is 5/5 throughout.  There is no pronator drift.  Normal muscle bulk and tone throughout.  - Reflexes:  2+ and symmetric at the biceps, triceps, brachioradialis, knees, and ankles.  Plantar responses flexor.  - Sensory:  Intact to light touch throughout.  - Coordination:  Finger-nose-finger and heel-knee-shin intact without dysmetria.  Rapid alternating hand movements mild difficulty  - Gait:   appears normal, able to stand on one leg as well                                  13.7   9.66  )-----------( 184      ( 29 Aug 2018 07:20 )             40.9       142  |  107  |  19  ----------------------------<  88  4.1   |  23  |  1.02    Ca    8.8      29 Aug 2018 07:20  Mg     2.1           RADIOLOGY & ADDITIONAL STUDIES:   < from: CT Brain Stroke Protocol (18 @ 10:56) >  IMPRESSION:    No acute intracranial hemorrhage, mass effect or midline shift.    The above findings were discussed with Dr. Mon by Dr. Deleon on 2018   10:55 AM, with read-back verification.    < end of copied text >     < from: CT Angio Neck w/ IV Cont (18 @ 10:56) >  IMPRESSION:     CTA NECK: No evidence of hemodynamically significant stenosis using   NASCET criteria. No evidence of arterial dissection.    CTA BRAIN: No major vessel occlusion or proximal stenosis.  Normal   anatomic variants as discussed in the body the report.    < end of copied text >    < from: MR Head No Cont (18 @ 15:54) >  Magnetic resonance imaging of the brain was carried out with transaxial   SPGR, FLAIR, fast spin echo T2 weighted images, axial susceptibility   weighted series, diffusion weighted series and sagittal T1 weighted   series on a 1.5 Ai magnet.    Comparison is made with the prior CT/CTA of earlier today.    Atrophy is identified with ventricular and sulcal prominence.    There are multiple tiny scattered infarcts on diffusion-weighted imaging   in the left centrum semiovale ovale, adjacent to the left lateral   ventricle, and the left parietal white matter and in the right occipital   region. Additionally there are a few scattered apparent areas of   restriction in the bridgette bilaterally. These are suspicious for embolic   infarcts in view of the multiple vascular territory involvement. Small   vessel white matter ischemic changes are noted.    There is no acute hemorrhage.    Sellar and parasellar structures are unremarkable.    < end of copied text >    < from: CLAUDIA w/o TTE (w/3D Echo) (18 @ 13:48) >  CONCLUSIONS:  1. Mitral valve prolapse involving a discrete portion of  the middle (P2) scallop of the posterior mitral leaflet.  Mild-moderate mitral regurgitation with an eccentric,  anteriorly directed jet.  2. Calcified trileaflet aortic valve with normal opening.  Moderate aortic regurgitation.  Vena contracta width about  0.4 cm.  3. Normal aortic root.  Mild non-mobile atherosclerotic  plaque in the aortic arch and descending thoracic aorta.  4. Normal left atrium.  No left atrial or left atrial  appendage thrombus.  5. Normal left ventricular systolic function. No segmental  wall motion abnormalities.  6. Normal right ventricular size and function.  7. Agitated saline injection and color flow Doppler  demonstrate evidence of a patent foramen ovale.  ------------------------------------------------------------------------  Confirmed on  2018 - 17:22:42 by Prabhu Steele M.D.  ------------------------------------------------------------------------    < end of copied text >      Assessment:  78 years old man with vascular risk factors of age and ? HTN (denies regular medical followup) is evaluated at [facility] for an acute onset of right-hand weakness/clumsiness. In the morning of  around 7:30-7:40 AM, he reports to have noticed acute onset of sensory paresthesia or right arm and shoulder with complete resolution subsequently. MRI brain showed acute/subacute small punctate embolic-looking infarcts involving multiple vascular distribution including bilateral MCAs, left VALERIE and basilar artery. CTA head and neck is grossly unremarkable. TTE did not show any obvious structural cardiac source of embolism, but showed possible right to left shunt. s/p CLAUDIA which also confirms PFO.     Impression:  Cerebral embolism with cerebral infarction. Stroke involving multiple vascular distributions including bilateral MCAs, left VALERIE and posterior circulation - likely etiology being cryptogenic stroke, probably related to embolism from a proximal source like cardiac/paradoxical source of embolism    Plan:  - c/w Aspirin and clopidogrel for aggressive secondary stroke prevention for 3 weeks followed by aspirin. Risks versus benefits, and adverse reactions/complications associated with medication use were discussed with him and his wife at bedside in detail. Importance of medication compliance was reemphasized  - Agree with pharmacological DVT prophylaxis   - Atorvastatin 80 mg at bedtime considering admission. LDL. Risk versus benefits, and adverse reactions associated with medication use, were discussed with him in detail, but he adamantly refused to be started on any statin medications due to "personal reasons." [employer] article printed and given for patient to review.  - HbA1C 5.0, continue with aggressive vascular risk factors modifications   - BP goals with gradual normotension  - CLAUDIA confirms PFO, no embolus  - S/p Loop recorder   Above mentioned plan was discussed with patient in detail. All the questions were answered and concerns were addressed. [facility] NEUROLOGY FOLLOW UP NOTE  ROE BENNETT  Male  MRN-[id number]      Interval Hx: pt feels well today, no new episode of numbness, tingling, HA, n/v/, In good spirits.  S/p CLAUDIA and loop recorder placement. Eager to go home    PAST MEDICAL & SURGICAL HISTORY:  No pertinent past medical history  S/P cataract surgery, HTN    Social Hx: Lives with family. No toxic habits.   FAMILY HISTORY:  Family history of heart disease (Father): father (sudden death at age 56 years; heart disease presumed to be due to Rheumatic heart disease  Family history of mental disorder (Mother): mother ( at age 91 years)    Home Medications:  boswellia alexis: orally once a day (25 Aug 2018 22:12)  chlorpheniramine: orally once a day, As Needed (25 Aug 2018 20:11)  Ginkgo Biloba: orally once a day (25 Aug 2018 22:11)  Multiple Vitamins oral tablet: 1 tab(s) orally once a day (25 Aug 2018 22:13)  Vitamin B Complex 100:  (25 Aug 2018 22:08)  Vitamin B12:  (25 Aug 2018 22:08)  Vitamin C 1000 mg oral tablet: 6 tab(s) orally once a day (6 grams daily) (25 Aug 2018 22:08)  Vitamin D3 10,000 intl units oral capsule: 1 cap(s) orally once a day - during the summer months (25 Aug 2018 22:09)  Vitamin D3 1000 intl units oral tablet: 12 tab(s) orally once a day (12,000 IU daily during winter months) (25 Aug 2018 22:10)    MEDICATIONS  (STANDING):  aspirin enteric coated 81 milliGRAM(s) Oral daily  atorvastatin 80 milliGRAM(s) Oral at bedtime  clopidogrel Tablet 75 milliGRAM(s) Oral daily    MEDICATIONS  (PRN): none.    Allergies  No Known Allergies    Vital Signs Last 24 Hrs  T(C): 36.8 (29 Aug 2018 12:21), Max: 36.8 (28 Aug 2018 21:35)  T(F): 98.3 (29 Aug 2018 12:21), Max: 98.3 (28 Aug 2018 21:35)  HR: 62 (29 Aug 2018 12:21) (62 - 79)  BP: 153/78 (29 Aug 2018 12:21) (128/73 - 153/78)  BP(mean): --  RR: 18 (29 Aug 2018 12:21) (18 - 18)  SpO2: 96% (29 Aug 2018 12:21) (96% - 100%)    PHYSICAL EXAM:  Constitutional: no acute distress, well nourished, well developed   Musculoskeletal:  no clubbing or cyanosis of the fingernails, no joint swelling, no myalgia  Eyes:  normal sclera and conjunctiva and pupils, no JODIE, no APD. Fundi: no papilledema.  Neurologic:  - Mental Status:  Alert, awake, oriented to person, place, and time; Speech is fluent with intact naming, repetition, and comprehension;   - Cranial Nerves II-XII:    II:  VFF, Pupils are equal, round, and reactive to light.  III, IV, VI:  Extraocular movements are intact without nystagmus.  V:  Facial sensation is intact in the V1-V3 distribution bilaterally.  VII:  Face is symmetric with normal eye closure and smile  VIII:  Hearing is intact to finger rub.  IX, X:  Uvula is midline and soft palate rises symmetrically  XI:  Head turning and shoulder shrug are intact.  XII:  Tongue protrudes in the midline.  - Motor:  Strength is 5/5 throughout.  There is no pronator drift.  Normal muscle bulk and tone throughout.  - Reflexes:  2+ and symmetric at the biceps, triceps, brachioradialis, knees, and ankles.  Plantar responses flexor.  - Sensory:  Intact to light touch throughout.  - Coordination:  Finger-nose-finger and heel-knee-shin intact without dysmetria.  Rapid alternating hand movements mild difficulty  - Gait:   appears normal, able to stand on one leg as well                                  13.7   9.66  )-----------( 184      ( 29 Aug 2018 07:20 )             40.9       142  |  107  |  19  ----------------------------<  88  4.1   |  23  |  1.02    Ca    8.8      29 Aug 2018 07:20  Mg     2.1           RADIOLOGY & ADDITIONAL STUDIES:   < from: CT Brain Stroke Protocol (18 @ 10:56) >  IMPRESSION:    No acute intracranial hemorrhage, mass effect or midline shift.    The above findings were discussed with Dr. Mon by Dr. Deleon on 2018   10:55 AM, with read-back verification.    < end of copied text >     < from: CT Angio Neck w/ IV Cont (18 @ 10:56) >  IMPRESSION:     CTA NECK: No evidence of hemodynamically significant stenosis using   NASCET criteria. No evidence of arterial dissection.    CTA BRAIN: No major vessel occlusion or proximal stenosis.  Normal   anatomic variants as discussed in the body the report.    < end of copied text >    < from: MR Head No Cont (18 @ 15:54) >  Magnetic resonance imaging of the brain was carried out with transaxial   SPGR, FLAIR, fast spin echo T2 weighted images, axial susceptibility   weighted series, diffusion weighted series and sagittal T1 weighted   series on a 1.5 Ai magnet.    Comparison is made with the prior CT/CTA of earlier today.    Atrophy is identified with ventricular and sulcal prominence.    There are multiple tiny scattered infarcts on diffusion-weighted imaging   in the left centrum semiovale ovale, adjacent to the left lateral   ventricle, and the left parietal white matter and in the right occipital   region. Additionally there are a few scattered apparent areas of   restriction in the bridgette bilaterally. These are suspicious for embolic   infarcts in view of the multiple vascular territory involvement. Small   vessel white matter ischemic changes are noted.    There is no acute hemorrhage.    Sellar and parasellar structures are unremarkable.    < end of copied text >    < from: CLAUDIA w/o TTE (w/3D Echo) (18 @ 13:48) >  CONCLUSIONS:  1. Mitral valve prolapse involving a discrete portion of  the middle (P2) scallop of the posterior mitral leaflet.  Mild-moderate mitral regurgitation with an eccentric,  anteriorly directed jet.  2. Calcified trileaflet aortic valve with normal opening.  Moderate aortic regurgitation.  Vena contracta width about  0.4 cm.  3. Normal aortic root.  Mild non-mobile atherosclerotic  plaque in the aortic arch and descending thoracic aorta.  4. Normal left atrium.  No left atrial or left atrial  appendage thrombus.  5. Normal left ventricular systolic function. No segmental  wall motion abnormalities.  6. Normal right ventricular size and function.  7. Agitated saline injection and color flow Doppler  demonstrate evidence of a patent foramen ovale.  ------------------------------------------------------------------------  Confirmed on  2018 - 17:22:42 by Prabhu Steele M.D.  ------------------------------------------------------------------------    < end of copied text >      Assessment:  78 years old man with vascular risk factors of age and ? HTN (denies regular medical followup) is evaluated at [facility] for an acute onset of right-hand weakness/clumsiness. In the morning of  around 7:30-7:40 AM, he reports to have noticed acute onset of sensory paresthesia or right arm and shoulder with complete resolution subsequently. MRI brain showed acute/subacute small punctate embolic-looking infarcts involving multiple vascular distribution including bilateral MCAs, left VALERIE and basilar artery. CTA head and neck is grossly unremarkable. TTE did not show any obvious structural cardiac source of embolism, but showed possible right to left shunt. s/p CLAUDIA which also confirms PFO and did not show any obvious structural cardiac source of embolism.      Impression:  Cerebral embolism with cerebral infarction. Stroke involving multiple vascular distributions including bilateral MCAs, left VALERIE and posterior circulation - likely etiology being cryptogenic stroke, probably related to embolism from a proximal source like cardiac/paradoxical source of embolism    Plan:  - c/w Aspirin and clopidogrel for aggressive secondary stroke prevention for 3 weeks followed by aspirin. Risks versus benefits, and adverse reactions/complications associated with medication use were discussed with him and his wife at bedside in detail. Importance of medication compliance was reemphasized  - Agree with pharmacological DVT prophylaxis   - Atorvastatin 80 mg at bedtime considering admission. LDL. Risk versus benefits, and adverse reactions associated with medication use, were discussed with him in detail, but he adamantly refused to be started on any statin medications due to "personal reasons"  - HbA1C 5.0, continue with aggressive vascular risk factors modifications   - BP goals with gradual normotension  - LE duplex to rule out DVT being paradoxical source of embolism   - S/p ICM placement for prolonged cardiac monitoring     Above mentioned plan was discussed with patient and family members at bedside in detail. All the questions were answered and concerns were addressed.

## 2018-08-29 NOTE — DISCHARGE NOTE ADULT - PLAN OF CARE
prevent reoccurrence of stroke continue aspirin and Plavix for 3 weeks then Aspirin ONLY!  follow up with Neurologist in 1-2 weeks Low sodium and fat diet, continue anti-hypertensive medications, and follow up with primary care physician.

## 2018-08-29 NOTE — DISCHARGE NOTE ADULT - ADDITIONAL INSTRUCTIONS
follow up with Neurologist Dr. Hussein in 1 -2 weeks  follow up with Dr. Cotton Cardiologist for outpt ischemic workup in 1 month  follow-up device clinic appointment on 9/12 at 2:00pm. 4th floor Oncology [facility]  [phone].

## 2018-08-29 NOTE — PROGRESS NOTE ADULT - ASSESSMENT
77 y/o M nonsmoker PMHx undiagnosed HTN, cataract surgery presented with an acute CVA. 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  Symptoms have completely resolved. There has been no EKG or telemetry evidence of atrial fibrillation so he is s/p ILR for prolonged monitoring for detection of AFib/PAF as a cause of a cardioembolic source.     Post procedure ILR teaching done..  Written instructions and contact information provided.  He has a follow-up device clinic appointment on 9/12 at 2:00pm. 4th floor Oncology Building  [phone].  Discharge as per primary team.      .
78 year old male, with past history significant for Cataract surgery, presented to the ED secondary to sudden onset of difficulty coordinating the fingers.  Vital signs upon ED presentation as follows: BP = 208/108, Hr = 76, RR = 18, T = 36.1 C (97 F), O2 Sat = 100% on RA.  MRI Scattered infarcts in the right occipital lobe, left centrum semiovale ovale and bridgette suspicious for multiple emboli. no acute stenosis on CTA H/N
78 year old male, with past history significant for Cataract surgery, presented to the ED secondary to sudden onset of difficulty coordinating the fingers.  Vital signs upon ED presentation as follows: BP = 208/108, Hr = 76, RR = 18, T = 36.1 C (97 F), O2 Sat = 100% on RA.  Diagnosed with Cerebrovascular Accident.

## 2018-08-29 NOTE — DISCHARGE NOTE ADULT - MEDICATION SUMMARY - MEDICATIONS TO TAKE
I will START or STAY ON the medications listed below when I get home from the hospital:    aspirin 81 mg oral delayed release tablet  -- 1 tab(s) by mouth once a day  -- Indication: For CVA (cerebral vascular accident)    atorvastatin 80 mg oral tablet  -- 1 tab(s) by mouth once a day (at bedtime)  -- Indication: For CVA (cerebral vascular accident)    clopidogrel 75 mg oral tablet  -- 1 tab(s) by mouth once a day x 3 weeks   -- Indication: For CVA (cerebral vascular accident)

## 2018-08-29 NOTE — PROGRESS NOTE ADULT - PROBLEM SELECTOR PROBLEM 5
Injury of meniscus of left knee, sequela

## 2018-08-29 NOTE — DISCHARGE NOTE ADULT - CARE PROVIDER_API CALL
Dex Hussein (MD), Neurology; Vascular Neurology  [address]  [address]  [place] [address]  Phone: [phone]  Fax: [phone]    Luke Cotton (MD; PhD), Cardiology; Internal Medicine; Vascular Medicine  [address] [address] [place] [address]  [place] [address]  Phone: [phone]  Fax: [phone]

## 2018-08-29 NOTE — PROGRESS NOTE ADULT - PROBLEM SELECTOR PLAN 3
- ECG w/ NSR at 73 bpm, LAD, incomplete LBBB, small U-waves  - no reports of palpitations, chest pain, shortness of breath  - findings may be chronic in patient who is very active (ran 3 marathons and works out constantly)  - does not have a PMD  -will obtain Ct coronaries for calcium score- case d/w Dr. Cotton will cancel CT coronaries need outpt f/u.

## 2018-08-29 NOTE — PROGRESS NOTE ADULT - PROBLEM SELECTOR PLAN 2
- BP to 208/108 on admission and now with CVA; not on antihypertensive medications at home  - patient reports history of labile blood pressure, with normal values being 120 to 130 systolic and 60 to 70 diastolic.  During stressful periods, however, BP significantly elevation.   -'s if BP continually above 150 will start antihypertensive meds
- BP to 208/108 on admission and now with CVA; not on antihypertensive medications at home  - patient reports history of labile blood pressure, with normal values being 120 to 130 systolic and 60 to 70 diastolic.  During stressful periods, however, BP significantly elevation.   -'s if BP continually above 150 will start antihypertensive meds
- BP to 208/108 on admission and now with CVA; not on antihypertensive medications at home  - patient reports history of labile blood pressure, with normal values being 120 to 130 systolic and 60 to 70 diastolic.  During stressful periods, however, BP significantly elevation.  Current elevation explained as being due to the assumption that he was having a CVA at the time of coming to the ED  - s/p labetalol 10 mg IV x one in the ED  - currently BP normotensive  - Neurology team recommends permissive HTN of  to 180 for 24 to 48 hours  - would start norvasc if BP persistently elevated above 150  - ECG as above  - f/u with repeat measurements
- BP to 208/108 on admission and now with CVA; not on antihypertensive medications at home  - patient reports history of labile blood pressure, with normal values being 120 to 130 systolic and 60 to 70 diastolic.  During stressful periods, however, BP significantly elevation.  Current elevation explained as being due to the assumption that he was having a CVA at the time of coming to the ED  - s/p labetalol 10 mg IV x one in the ED  - currently BP normotensive  - Neurology team recommends permissive HTN of  to 180 for 24 to 48 hours  - ECG as above  - f/u with repeat measurements

## 2018-08-29 NOTE — PROGRESS NOTE ADULT - SUBJECTIVE AND OBJECTIVE BOX
Patient is a 78y old  Male who presents with a chief complaint of Cerebrovascular Accident (25 Aug 2018 21:01)      SUBJECTIVE / OVERNIGHT EVENTS: Pt in NAD no acute events ON     MEDICATIONS  (STANDING):  aspirin enteric coated 81 milliGRAM(s) Oral daily  atorvastatin 80 milliGRAM(s) Oral at bedtime  clopidogrel Tablet 75 milliGRAM(s) Oral daily  docusate sodium 100 milliGRAM(s) Oral two times a day  enoxaparin Injectable 40 milliGRAM(s) SubCutaneous daily    MEDICATIONS  (PRN):        CAPILLARY BLOOD GLUCOSE        I&O's Summary      T(C): 36.7 (08-29-18 @ 06:11), Max: 36.8 (08-28-18 @ 21:35)  HR: 63 (08-29-18 @ 06:11) (63 - 79)  BP: 150/74 (08-29-18 @ 06:11) (128/73 - 150/74)  RR: 18 (08-29-18 @ 06:11) (18 - 18)  SpO2: 100% (08-29-18 @ 06:11) (100% - 100%)    PHYSICAL EXAM:  GENERAL: NAD, well-developed  HEAD:  Atraumatic, Normocephalic  EYES: EOMI, PERRLA, conjunctiva and sclera clear  NECK: Supple, No JVD  CHEST/LUNG: Clear to auscultation bilaterally; No wheeze  HEART: Regular rate and rhythm; No murmurs, rubs, or gallops  ABDOMEN: Soft, Nontender, Nondistended; Bowel sounds present  EXTREMITIES:  2+ Peripheral Pulses, No clubbing, cyanosis, or edema  PSYCH: AAOx3  NEUROLOGY: non-focal    LABS:                        13.7   9.66  )-----------( 184      ( 29 Aug 2018 07:20 )             40.9     08-29    142  |  107  |  19  ----------------------------<  88  4.1   |  23  |  1.02    Ca    8.8      29 Aug 2018 07:20  Mg     2.1     08-29                  RADIOLOGY & ADDITIONAL TESTS:    Imaging Personally Reviewed:< from: CLAUDIA w/o TTE (w/3D Echo) (08.28.18 @ 13:48) >  Patient name: ROE BENNETT  YOB: 1940   Age: 78 (M)   MR#: [id number]  Study Date: 8/28/2018  Location: [place] [address] CLAUDIA OnlySonographer: Fransico Clay M.D.  Study quality: Technically good  Referring Physician: Bart Red MD  Blood Pressure: 148/71 mmHg  Height: 182 cm  Weight: 72 kg  BSA: 1.9 m2  ------------------------------------------------------------------------  PROCEDURE: Transesophageal (CLAUDIA) was performed in the  Electrophysiology Laboratory.  Informed consent was first  obtained for CLAUDIA.  The patient was sedated by Anesthesia.  The procedure was monitored with automatic blood pressure  monitoring, ECG tracings and pulse oximetry.  Gag reflex  was abolished with topical Cetacaine and the  transesophageal probe wasplaced in the esophagus posterior  to the heart without complications.  The patient tolerated  the procedure well.   Real-time and reconstructed  3-dimensional imaging was performed.  Color Doppler  analysis was carried out using both 2D and 3D mapping.  Patient was injected with 10 cc's of aerosolized saline.  Patient was injected with 10 cc's of aerosolized saline.  INDICATION: Cerebral infarction, unspecified (I63.9)  ------------------------------------------------------------------------  OBSERVATIONS:  Mitral Valve: Mitral valve prolapse involving a discrete  portion of the middle (P2) scallop of the posterior mitral  leaflet. Mild-moderate mitral regurgitation with an  eccentric, anteriorly directed jet.  Aortic Root: Normal aortic root.  Mild non-mobile  atherosclerotic plaque in the aortic arch and descending  thoracic aorta.  Aortic Valve: Calcified trileaflet aortic valve with normal  opening. Moderate aortic regurgitation.  Vena contracta  width about  0.4 cm.  Left Atrium: Normal left atrium.  No left atrial or left  atrial appendage thrombus.  Left Ventricle: Normal left ventricular systolic function.  No segmental wall motion abnormalities.  Right Heart: Normal right atrium. Normal right ventricular  size and function. Normal tricuspid valve. Minimal  tricuspid regurgitation. Normal pulmonic valve. Mild  pulmonic regurgitation.  Pericardium/PleuraNormal pericardium with no pericardial  effusion.  Hemodynamic: Agitated saline injection and color flow  Doppler demonstrate evidence of a patent foramen ovale.  ------------------------------------------------------------------------  CONCLUSIONS:  1. Mitral valve prolapse involving a discrete portion of  the middle (P2) scallop of the posterior mitral leaflet.  Mild-moderate mitral regurgitation with an eccentric,  anteriorly directed jet.  2. Calcified trileaflet aortic valve with normal opening.  Moderate aortic regurgitation.  Vena contracta width about  0.4 cm.  3. Normal aortic root.  Mild non-mobile atherosclerotic  plaque in the aortic arch and descending thoracic aorta.  4. Normal left atrium.  No left atrial or left atrial  appendage thrombus.  5. Normal left ventricular systolic function. No segmental  wall motion abnormalities.  6. Normal right ventricular size and function.  7. Agitated saline injection and color flow Doppler  demonstrate evidence of a patent foramen ovale.    < end of copied text >      Consultant(s) Notes Reviewed:      Care Discussed with Consultants/Other Providers:

## 2018-08-29 NOTE — DISCHARGE NOTE ADULT - HOSPITAL COURSE
78 year old male, with past history significant for Cataract surgery, presented to the ED secondary to sudden onset of difficulty coordinating the fingers.  Vital signs upon ED presentation as follows: BP = 208/108, Hr = 76, RR = 18, T = 36.1 C (97 F), O2 Sat = 100% on RA.  MRI Scattered infarcts in the right occipital lobe, left centrum semiovale ovale and bridgette suspicious for multiple emboli. no acute stenosis on CTA H/N      ·  Problem: Cerebrovascular accident (CVA) due to embolism of cerebral artery.  Plan: 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 w/o hemorrhage  - ASA/Plavix x3 weeks  -statin LDL -165 refusing to take statin educated that goal LDL<100   -TTE-bubble study positive w/ possibility of PFO check LE duplex   -CLAUDIA-PFO+ no FAY thrombus noted  - s/p ILR  - telemetry monitoring  - passed swallow eval  -  PT/OT eval-home OT       ·  Problem: Hypertensive emergency.  Plan: - BP to 208/108 on admission and now with CVA; not on antihypertensive medications at home  - patient reports history of labile blood pressure, with normal values being 120 to 130 systolic and 60 to 70 diastolic.  During stressful periods, however, BP significantly elevation.   -'s if BP continually above 150 will start antihypertensive meds.     ·  Problem: Abnormal ECG.  Plan: - ECG w/ NSR at 73 bpm, LAD, incomplete LBBB, small U-waves  - no reports of palpitations, chest pain, shortness of breath  - findings may be chronic in patient who is very active (ran 3 marathons and works out constantly)  - does not have a PMD  -will obtain Ct coronaries for calcium score- case d/w Dr. Cotton will cancel CT coronaries need outpt f/u.     ·  Problem: Nutritional assessment.  Plan: - patient takes several vitamin and herbal supplements, but unable to recall all  - very conscious about diet also; pesco vegetarian  - vitamins include MVI daily, Vit D 10,000 IU daily during summer months, and 12,000 IU daily during winter months, Vit C 6 grams daily, Vit K, Vit B12, Vit-B complex, Boswellia alexis  - concern for potential side effects, including liver and kidney  - consider nutrition consult to discuss any potential side effects  - no vitamin supplements prescribed at present.       ·  Problem: Injury of meniscus of left knee, sequela.  Plan: - no pain, tenderness, difficulty with ROM at present  - reports undergoing MRI study of said knee in the recent past  - patient reports temporary limitations in exercising due to same  - evaluate need for physical therapy-home OT.    Patient stable for d/c home today, outpt follow up with Neurologist and cardiologist.

## 2018-08-29 NOTE — PROGRESS NOTE ADULT - PROBLEM SELECTOR PROBLEM 1
Cerebrovascular accident (CVA) due to embolism of cerebral artery

## 2018-08-29 NOTE — PROGRESS NOTE ADULT - ATTENDING COMMENTS
discharge planning to home if LE duplex neg for DVT with outpt f/u neuro/cards and EP discharge planning to home if LE duplex neg for DVT with outpt f/u neuro/cards and EP. 36 min

## 2018-08-29 NOTE — PROGRESS NOTE ADULT - PROBLEM SELECTOR PLAN 1
[facility] AMB[facility]ATORY ENCOUNTER  ENT CONSULT NOTE    Reason for Consult/Chief complaint:  Caio Rain is being seen today in consultation at the request of Mando Tate MD for tinnitus.    History of Present Illness:  52 year old male presents referred for evaluation of bilateral tinnitus. He notes that symptoms have been present for about 1 year. He notes a high pitched ringing in both ears that is non-pulsatile. He notices it more when it is quiet, but he does not find it particularly bothersome. It does not keep him awake at night. He denies any subjective hearing loss. He does have a history of significant occupational noise exposure as a , states he does not always wear hearing protection. No history of ear surgery, trauma or infection in the past. He denies any otalgia, otorrhea or vertigo.    Review of Systems:   The patient is positive for bilateral tinnitus.  Pertinent negatives include otalgia, otorrhea, vertigo, headaches, vision changes.  All other systems have been reviewed and are negative.    Past Medical History:   Diagnosis Date   • Controlled diabetes mellitus type II without complication    • Dermatitis     6/2016   • Essential hypertension    • Hyperlipidemia    • Left leg pain    • Morbid obesity    • Type II diabetes mellitus      Surgical history reviewed. No pertinent surgical history.     Family History   Problem Relation Age of Onset   • Diabetes Mother    • Heart disease Father    • High blood pressure Father    • High blood pressure Brother         Current Outpatient Prescriptions   Medication Sig   • Lancets (FREESTYLE) Misc To be used with blood glucose meter for checking blood sugars   • aspirin 81 MG tablet Take 81 mg by mouth daily.   • blood glucose (ACCU-CHEK COMFORT CURVE) test strip Test blood sugar 3 times daily as directed. Diagnosis: Diabetes Mellitus type II Meter: Accu check Jennie Meter   • hydrochlorothiazide (HYDRODIURIL) 25 MG tablet Take 1 tablet by mouth  daily.   • lisinopril (ZESTRIL) 20 MG tablet Take 20 mg by mouth daily.   • metformin (GLUCOPHAGE) 1000 MG tablet Take 1,000 mg by mouth daily.   • insulin glargine (LANTUS) 100 UNIT/ML injectable solution Inject 30 Units into the skin every morning.     No current facility-administered medications for this visit.        ALLERGIES:  No Known Allergies    Social History     Social History   • Marital status:      Spouse name: N/A   • Number of children: N/A   • Years of education: N/A     Social History Main Topics   • Smoking status: Former Smoker     Types: Cigarettes   • Smokeless tobacco: None   • Alcohol use No   • Drug use: No   • Sexual activity: Not Asked     Other Topics Concern   • None     Social History Narrative       Physical examination:  Visit Vitals   • /90   • Pulse 65   • Temp 97.7 °F (36.5 °C) (Tympanic)       On exam, patient is alert, oriented to person, place and time, well-developed, well-nourished, in no apparent distress. Patient is attentive and responds to questions appropriately. Gait is grossly normal.  Voice is not hoarse.    Overall appearance of the head and neck is normal. Facial symmetry and movement are within normal limits bilaterally, and skin is warm and dry, no rashes noted.    Eyes: Extraocular movement is preserved. Pupils are normal in size and symmetric, conjunctivae and lids are grossly normal bilaterally.    Ears: Normal pinnae bilaterally, normal ear canals, normal tympanic membranes bilaterally. Normal motion of the tympanic membranes on pneumatic otoscopy.  No temporomandibular joint tenderness. No mastoid process tenderness. Speech reception within normal limits.    Nose: Midline septum, normal bridge, normal mucosa.  No sinus tenderness on percussion.    Oral cavity: Normal lips, gums, floor of mouth, buccal mucosa, tongue, hard palate.    Oropharynx: Normal soft palate, posterior pharyngeal wall, tonsillar fossae and tonsils.    Neck:  No abnormal  masses, thyroid masses or enlargement, cervical or supraclavicular lymphadenopathy, crepitus, or tenderness. Trachea midline. Salivary glands are normal to palpation bilaterally.     Chest:  No respiratory distress, stridor, wheezing.    Cardiovascular: No peripheral edema, normal peripheral perfusion, no pallor. Carotid pulses are normal.    Neuro: Cranial nerve function grossly within normal limits.    I independently reviewed and interpreted the patient's audiogram performed today which is remarkable for: Normal hearing through 2000 Hz downlsoping to a mild to moderate high frequency sensorineural hearing loss, symmetric. Normal tympanograms bilaterally.     Assessment:  1. Tinnitus, bilateral    2. Hearing loss, sensorineural, high frequency, bilateral      Plan:  Tinnitus is likely secondary to high frequency hearing loss and chronic noise exposure. I counseled the patient regarding tinnitus etiology, and on management strategies, in particular the usefulness of masking techniques.  The patient was given a handout to reinforce this counseling. His hearing loss is mild at this point but I did stress the importance of being vigilant with hearing protection to preserve his current hearing and help prevent any further deterioration. The patient indicated understanding of the diagnosis and agreed with the plan of care. Follow up with ENT as needed.    Nicole Goode MD  03/09/17    Cc: Mando Tate MD   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 w/o hemorrhage  - ASA/Plavix x3 weeks  -statin LDL -165 refusing to take statin educated that goal LDL<100   -TTE-bubble study positive w/ possibility of PFO check LE duplex   -CLAUDIA-PFO+ no FAY thrombus noted  - s/p ILR  - telemetry monitoring  - passed swallow eval  -  PT/OT eval-home OT  - initially refusing DVT prophyalxis explained to pt its standard of care for CVA agreeable to lovenox

## 2018-08-29 NOTE — DISCHARGE NOTE ADULT - PATIENT PORTAL LINK FT
You can access the [facility], offered by [facility], by registering with the following website: [email or web address]

## 2018-08-29 NOTE — PROGRESS NOTE ADULT - SUBJECTIVE AND OBJECTIVE BOX
ANESTHESIA POSTOP CHECK    78y Male POSTOP DAY 1 S/P Attila loop recorder    [x ] NO APPARENT ANESTHESIA COMPLICATIONS      Comments:

## 2018-08-29 NOTE — PROGRESS NOTE ADULT - SUBJECTIVE AND OBJECTIVE BOX
Patient feels well.  No complaints of chest pain, shortness of breath, palpitations or lightheadedness. s/p loop recorder yesterday.  Tolerated the procedure well. No complications.  Site closed with Dermabond glue.  No swelling, ecchymosis or hematoma. No pain at eh device site.    Vital Signs Last 24 Hrs  T(C): 36.7 (29 Aug 2018 06:11), Max: 36.8 (28 Aug 2018 21:35)  T(F): 98.1 (29 Aug 2018 06:11), Max: 98.3 (28 Aug 2018 21:35)  HR: 63 (29 Aug 2018 06:11) (63 - 79)  BP: 150/74 (29 Aug 2018 06:11) (128/73 - 150/74)  BP(mean): --  RR: 18 (29 Aug 2018 06:11) (18 - 18)  SpO2: 100% (29 Aug 2018 06:11) (100% - 100%)      EKG  Telemetry : Normal sinus rhythm 60-70's.    MEDICATIONS  (STANDING):  aspirin enteric coated 81 milliGRAM(s) Oral daily  atorvastatin 80 milliGRAM(s) Oral at bedtime  clopidogrel Tablet 75 milliGRAM(s) Oral daily  docusate sodium 100 milliGRAM(s) Oral two times a day  enoxaparin Injectable 40 milliGRAM(s) SubCutaneous daily    MEDICATIONS  (PRN):          Physical exam:   Gen- Well developed.   Well nourished. NAD  Resp- clear to auscultation.  No wheezing, rales or rhonchi  CV- S1 and S2 normal.  RRR.  No murmurs, gallops or rubs.  ABD- soft nontender +bowel sounds  EXT- no edema  Neuro- grossly nonfocal                            13.7   9.66  )-----------( 184      ( 29 Aug 2018 07:20 )             40.9       08-29    142  |  107  |  19  ----------------------------<  88  4.1   |  23  |  1.02    Ca    8.8      29 Aug 2018 07:20  Mg     2.1     08-29

## 2018-08-29 NOTE — PROGRESS NOTE ADULT - PROBLEM SELECTOR PLAN 5
- no pain, tenderness, difficulty with ROM at present  - reports undergoing MRI study of said knee in the recent past  - patient reports temporary limitations in exercising due to same  - evaluate need for physical therapy
- no pain, tenderness, difficulty with ROM at present  - reports undergoing MRI study of said knee in the recent past  - patient reports temporary limitations in exercising due to same  - evaluate need for physical therapy
- no pain, tenderness, difficulty with ROM at present  - reports undergoing MRI study of said knee in the recent past  - patient reports temporary limitations in exercising due to same  - evaluate need for physical therapy-home OT
- no pain, tenderness, difficulty with ROM at present  - reports undergoing MRI study of said knee in the recent past  - patient reports temporary limitations in exercising due to same  - evaluate need for physical therapy

## 2018-08-29 NOTE — DISCHARGE NOTE ADULT - CARE PROVIDERS DIRECT ADDRESSES
[email or web address]

## 2018-08-29 NOTE — DISCHARGE NOTE ADULT - CARE PLAN
Principal Discharge DX:	CVA (cerebral vascular accident)  Goal:	prevent reoccurrence of stroke  Assessment and plan of treatment:	continue aspirin and Plavix for 3 weeks then Aspirin ONLY!  follow up with Neurologist in 1-2 weeks  Secondary Diagnosis:	Hypertensive emergency  Assessment and plan of treatment:	Low sodium and fat diet, continue anti-hypertensive medications, and follow up with primary care physician.  Secondary Diagnosis:	PFO (patent foramen ovale)

## 2018-08-31 LAB
BACTERIA BLD CULT: SIGNIFICANT CHANGE UP
BACTERIA BLD CULT: SIGNIFICANT CHANGE UP

## 2018-09-10 ENCOUNTER — TRANSCRIPTION ENCOUNTER (OUTPATIENT)
Age: 78
End: 2018-09-10

## 2018-09-12 ENCOUNTER — APPOINTMENT (OUTPATIENT)
Dept: ELECTROPHYSIOLOGY | Facility: CLINIC | Age: 78
End: 2018-09-12
Payer: MEDICARE

## 2018-09-12 VITALS — HEART RATE: 68 BPM | SYSTOLIC BLOOD PRESSURE: 158 MMHG | RESPIRATION RATE: 14 BRPM | DIASTOLIC BLOOD PRESSURE: 82 MMHG

## 2018-09-12 PROCEDURE — 99024 POSTOP FOLLOW-UP VISIT: CPT

## 2018-09-12 RX ORDER — ATORVASTATIN CALCIUM 80 MG/1
80 TABLET, FILM COATED ORAL DAILY
Refills: 0 | Status: DISCONTINUED | COMMUNITY
Start: 2018-08-30 | End: 2018-09-12

## 2018-09-20 ENCOUNTER — APPOINTMENT (OUTPATIENT)
Dept: CARDIOLOGY | Facility: CLINIC | Age: 78
End: 2018-09-20
Payer: MEDICARE

## 2018-09-20 ENCOUNTER — NON-APPOINTMENT (OUTPATIENT)
Age: 78
End: 2018-09-20

## 2018-09-20 VITALS
SYSTOLIC BLOOD PRESSURE: 187 MMHG | BODY MASS INDEX: 24.38 KG/M2 | TEMPERATURE: 98.4 F | WEIGHT: 180 LBS | DIASTOLIC BLOOD PRESSURE: 72 MMHG | HEIGHT: 72 IN | OXYGEN SATURATION: 96 % | HEART RATE: 65 BPM | RESPIRATION RATE: 16 BRPM

## 2018-09-20 DIAGNOSIS — Z91.19 PATIENT'S NONCOMPLIANCE WITH OTHER MEDICAL TREATMENT AND REGIMEN: ICD-10-CM

## 2018-09-20 PROCEDURE — 93000 ELECTROCARDIOGRAM COMPLETE: CPT

## 2018-09-20 PROCEDURE — 99215 OFFICE O/P EST HI 40 MIN: CPT

## 2018-09-25 ENCOUNTER — APPOINTMENT (OUTPATIENT)
Dept: CARDIOLOGY | Facility: CLINIC | Age: 78
End: 2018-09-25
Payer: MEDICARE

## 2018-09-25 PROBLEM — Z91.19 NON-ADHERENCE TO MEDICAL TREATMENT: Status: ACTIVE | Noted: 2018-09-25

## 2018-09-25 PROCEDURE — 93790 AMBL BP MNTR W/SW I&R: CPT

## 2018-10-15 ENCOUNTER — APPOINTMENT (OUTPATIENT)
Dept: NEUROLOGY | Facility: CLINIC | Age: 78
End: 2018-10-15
Payer: MEDICARE

## 2018-10-15 VITALS
HEART RATE: 62 BPM | SYSTOLIC BLOOD PRESSURE: 189 MMHG | DIASTOLIC BLOOD PRESSURE: 78 MMHG | HEIGHT: 72 IN | WEIGHT: 180 LBS | BODY MASS INDEX: 24.38 KG/M2

## 2018-10-15 DIAGNOSIS — Z78.9 OTHER SPECIFIED HEALTH STATUS: ICD-10-CM

## 2018-10-15 PROCEDURE — 99215 OFFICE O/P EST HI 40 MIN: CPT

## 2018-10-15 RX ORDER — ASPIRIN 81 MG/1
81 TABLET ORAL DAILY
Refills: 0 | Status: DISCONTINUED | COMMUNITY
Start: 2018-08-30 | End: 2018-10-15

## 2018-10-16 ENCOUNTER — APPOINTMENT (OUTPATIENT)
Dept: ELECTROPHYSIOLOGY | Facility: CLINIC | Age: 78
End: 2018-10-16
Payer: MEDICARE

## 2018-10-16 PROCEDURE — 93299: CPT

## 2018-10-16 PROCEDURE — 93298 REM INTERROG DEV EVAL SCRMS: CPT

## 2018-10-18 ENCOUNTER — APPOINTMENT (OUTPATIENT)
Dept: CARDIOLOGY | Facility: CLINIC | Age: 78
End: 2018-10-18
Payer: MEDICARE

## 2018-10-18 VITALS
RESPIRATION RATE: 16 BRPM | WEIGHT: 180 LBS | DIASTOLIC BLOOD PRESSURE: 73 MMHG | SYSTOLIC BLOOD PRESSURE: 173 MMHG | HEART RATE: 64 BPM | TEMPERATURE: 97.6 F | HEIGHT: 72 IN | OXYGEN SATURATION: 95 % | BODY MASS INDEX: 24.38 KG/M2

## 2018-10-18 DIAGNOSIS — I10 ESSENTIAL (PRIMARY) HYPERTENSION: ICD-10-CM

## 2018-10-18 PROCEDURE — 93000 ELECTROCARDIOGRAM COMPLETE: CPT

## 2018-10-18 PROCEDURE — 99215 OFFICE O/P EST HI 40 MIN: CPT

## 2018-10-23 PROBLEM — I10 CHRONIC HYPERTENSION: Status: ACTIVE | Noted: 2018-09-20

## 2018-11-27 ENCOUNTER — APPOINTMENT (OUTPATIENT)
Dept: ELECTROPHYSIOLOGY | Facility: CLINIC | Age: 78
End: 2018-11-27
Payer: MEDICARE

## 2018-11-27 PROCEDURE — 93299: CPT

## 2018-11-27 PROCEDURE — 93298 REM INTERROG DEV EVAL SCRMS: CPT

## 2019-01-15 ENCOUNTER — APPOINTMENT (OUTPATIENT)
Dept: ELECTROPHYSIOLOGY | Facility: CLINIC | Age: 79
End: 2019-01-15
Payer: MEDICARE

## 2019-01-15 PROCEDURE — 93299: CPT

## 2019-01-15 PROCEDURE — 93298 REM INTERROG DEV EVAL SCRMS: CPT

## 2019-01-20 ENCOUNTER — RECORD ABSTRACTING (OUTPATIENT)
Age: 79
End: 2019-01-20

## 2019-02-13 ENCOUNTER — APPOINTMENT (OUTPATIENT)
Dept: NEUROLOGY | Facility: CLINIC | Age: 79
End: 2019-02-13

## 2019-03-07 ENCOUNTER — APPOINTMENT (OUTPATIENT)
Dept: NEUROLOGY | Facility: CLINIC | Age: 79
End: 2019-03-07
Payer: MEDICARE

## 2019-03-07 PROCEDURE — 93886 INTRACRANIAL COMPLETE STUDY: CPT

## 2019-03-07 PROCEDURE — 93892 TCD EMBOLI DETECT W/O INJ: CPT

## 2019-03-07 PROCEDURE — 93880 EXTRACRANIAL BILAT STUDY: CPT

## 2019-03-14 ENCOUNTER — APPOINTMENT (OUTPATIENT)
Dept: ELECTROPHYSIOLOGY | Facility: CLINIC | Age: 79
End: 2019-03-14

## 2019-03-15 ENCOUNTER — APPOINTMENT (OUTPATIENT)
Dept: NEUROLOGY | Facility: CLINIC | Age: 79
End: 2019-03-15
Payer: MEDICARE

## 2019-03-15 VITALS
SYSTOLIC BLOOD PRESSURE: 165 MMHG | HEIGHT: 72 IN | HEART RATE: 66 BPM | WEIGHT: 184 LBS | DIASTOLIC BLOOD PRESSURE: 80 MMHG | BODY MASS INDEX: 24.92 KG/M2

## 2019-03-15 VITALS
DIASTOLIC BLOOD PRESSURE: 79 MMHG | HEART RATE: 66 BPM | SYSTOLIC BLOOD PRESSURE: 165 MMHG | BODY MASS INDEX: 24.92 KG/M2 | HEIGHT: 72 IN | WEIGHT: 184 LBS

## 2019-03-15 DIAGNOSIS — Z86.19 PERSONAL HISTORY OF OTHER INFECTIOUS AND PARASITIC DISEASES: ICD-10-CM

## 2019-03-15 PROCEDURE — 99215 OFFICE O/P EST HI 40 MIN: CPT

## 2019-03-15 NOTE — PHYSICAL EXAM
[General Appearance - Alert] : alert [General Appearance - In No Acute Distress] : in no acute distress [Oriented To Time, Place, And Person] : oriented to person, place, and time [Impaired Insight] : insight and judgment were intact [Affect] : the affect was normal [Sclera] : the sclera and conjunctiva were normal [PERRL With Normal Accommodation] : pupils were equal in size, round, reactive to light, with normal accommodation [Extraocular Movements] : extraocular movements were intact [Outer Ear] : the ears and nose were normal in appearance [Oropharynx] : the oropharynx was normal [Neck Appearance] : the appearance of the neck was normal [Neck Cervical Mass (___cm)] : no neck mass was observed [Jugular Venous Distention Increased] : there was no jugular-venous distention [Thyroid Diffuse Enlargement] : the thyroid was not enlarged [Thyroid Nodule] : there were no palpable thyroid nodules [Auscultation Breath Sounds / Voice Sounds] : lungs were clear to auscultation bilaterally [Heart Rate And Rhythm] : heart rate was normal and rhythm regular [Heart Sounds] : normal S1 and S2 [Heart Sounds Gallop] : no gallops [Murmurs] : no murmurs [Heart Sounds Pericardial Friction Rub] : no pericardial rub [Arterial Pulses Carotid] : carotid pulses were normal with no bruits [Edema] : there was no peripheral edema [Veins - Varicosity Changes] : there were no varicosital changes [No CVA Tenderness] : no ~M costovertebral angle tenderness [No Spinal Tenderness] : no spinal tenderness [Abnormal Walk] : normal gait [Nail Clubbing] : no clubbing  or cyanosis of the fingernails [Musculoskeletal - Swelling] : no joint swelling seen [Motor Tone] : muscle strength and tone were normal [Skin Color & Pigmentation] : normal skin color and pigmentation [Skin Turgor] : normal skin turgor [] : no rash [FreeTextEntry1] : He looked generally well. Mental status was intact. He was alert, attentive and fully oriented. Speech was fluent and comprehension intact. Memory and fund of knowledge were normal. On cranial nerve exam, I was unable to see the fundi; the remainder of cranial nerves II through XII was intact. On motor exam tone was normal. There was a right arm pronator drift, but no downward drift. Fine finger movements were within normal limits. Power was normal throughout. Reflexes were 2+ throughout except the ankle jerks which were 1+-2+. Plantar reflexes were downgoing on the left and showed withdrawal on the right. Coordination and gait were normal. Tandem gait was normal. Romberg test was negative. Sensory exam was intact to all modalities.

## 2019-03-15 NOTE — DISCUSSION/SUMMARY
[FreeTextEntry1] : Summary from Dr. Dex Hussein's note dated 10/15/18-with modification\par He was admitted for stroke in 8/18.\par He presented to [facility] for an acute onset of right-hand weakness/clumsiness on 8/25/18. He  noticed acute onset of numbness of the right arm and shoulder lasting few hours with complete resolution subsequently. \par \par MRI brain showed acute/subacute small punctate embolic-looking infarcts involving multiple vascular distribution including bilateral MCAs, left VALERIE and basilar artery. The Remainder of his neurovascular workup was unremarkable except for a PFO. \par Impression: Cerebral embolism with cerebral infarction. Stroke involving multiple vascular distributions including bilateral MCAs, left VALERIE and posterior circulation - likely etiology being embolic stroke of unknown source, possibly cardioembolism or paradoxical embolism. \par Plan: Clopidogrel  indefinitely \par \par 3/15/19. Overall he is neurologically intact.\par \par He had been started on clopidogrel, because baby aspirin previously was associated with tinnitus. He refuses to take the clopidogrel, because he had read that clopidogrel can increase blood pressure. He stated that he does not need any antithrombotic agents, because his "circulation is good" based on Dopplers and other vascular studies. He feels that the hot pepper concentrate is an adequate antithrombotic agent without side effects. He also prefers not to take medication for his blood pressure, because his hypertension is "neurological" and he is using a vagal nerve stimulator for this purpose. I did try to explain that I felt that the benefits of a medication such as clopidogrel would outweigh the risks, and I tried to gently but firmly encourage him to use this medication, but he refused. I suggested that an alternative would be Aggrenox since the amount of aspirin is very low, and perhaps he would not experience tinnitus. He agreed to read about Aggrenox and consider it if he were comfortable with it.\par \par Occult malignancy is in the differential diagnosis of scattered small infarcts, and I have requested CT scan of chest/abdomen/pelvis.\par \par He can followup with me in approximately 6 months. I hope that he remains free of further serious trouble.\par \par \par

## 2019-03-15 NOTE — CONSULT LETTER
[Dear  ___] : Dear ~NIKOS, [Consult Letter:] : I had the pleasure of evaluating your patient, [unfilled]. [Please see my note below.] : Please see my note below. [Consult Closing:] : Thank you very much for allowing me to participate in the care of this patient.  If you have any questions, please do not hesitate to contact me. [Sincerely,] : Sincerely, [FreeTextEntry2] : Luke Cotton M.D.

## 2019-03-15 NOTE — HISTORY OF PRESENT ILLNESS
[FreeTextEntry1] : 78-year-old right-handed gentleman\par Summary from Dr. Dex Hussein's note dated 10/15/18-with modification\par He was admitted for stroke in 8/18.\par He presented to [facility] for an acute onset of right-hand weakness/clumsiness on 8/25/18.  On the morning of 8/27 around 7:30-7:40 AM, he  noticed acute onset of numbness of the right arm and shoulder lasting few hours with complete resolution subsequently. \par \par Since his discharge from the hospital, he reports complete resolution of his focal neurological symptoms/deficits. His current post-stroke mRS is reported to be 2 as he has not been able to perform all his activities as before his stroke but reports complete independence of ADLs. \par \par MRI brain showed acute/subacute small punctate embolic-looking infarcts involving multiple vascular distribution including bilateral MCAs, left VALERIE and basilar artery. CTA head and neck is grossly unremarkable. TTE did not show any obvious structural cardiac source of embolism, but showed possible right to left shunt. CLAUDIA: Mitral valve prolapse, mild to moderate mitral regurgitation, mild non-mobile atherosclerotic plaque in the aortic arch and descending thoracic aorta,evidence of a PFO.\par Lower extremity duplex: No evidence of DVT      \par \par Impression:\par Cerebral embolism with cerebral infarction. Stroke involving multiple vascular distributions including bilateral MCAs, left VALERIE and posterior circulation - likely etiology being embolic stroke of unknown source, possibly cardioembolism or paradoxical embolism. \par \par Additional Workup:\par LDL: 165 (8/18).\par \par Loop Recorder implanted  8/28/18.\par \par 3/15/19. He came to the office today. He reports no new or recurrent focal neurologic symptoms.\par \par He has stopped taking his clopidogrel because he had read that it can be associated with high blood pressure. He does not want to take aspirin because previously baby aspirin had been associated with tinnitus. He would also like to stop his losartan, because he "felt something that works better, hot pepper concentrate". He stated that his hypertension was "neurological" and he has been using a vagal nerve stimulator to treat his blood pressure.\par \par  Carotid Doppler (3/7/19) showed mild heterogeneous plaque on the right with less than 40% stenosis. The left carotid system was normal. The extracranial vertebral arteries showed antegrade flow with a normal resistance pattern.\par \par Transcranial Doppler (3/7/19) of the [place] was normal with no sign of stenosis.\par \par

## 2019-03-18 ENCOUNTER — OTHER (OUTPATIENT)
Age: 79
End: 2019-03-18

## 2019-03-21 ENCOUNTER — FORM ENCOUNTER (OUTPATIENT)
Age: 79
End: 2019-03-21

## 2019-03-22 ENCOUNTER — APPOINTMENT (OUTPATIENT)
Dept: CT IMAGING | Facility: CLINIC | Age: 79
End: 2019-03-22
Payer: MEDICARE

## 2019-03-22 ENCOUNTER — OUTPATIENT (OUTPATIENT)
Dept: OUTPATIENT SERVICES | Facility: HOSPITAL | Age: 79
LOS: 1 days | End: 2019-03-22
Payer: MEDICARE

## 2019-03-22 DIAGNOSIS — I63.9 CEREBRAL INFARCTION, UNSPECIFIED: ICD-10-CM

## 2019-03-22 DIAGNOSIS — I63.413 CEREBRAL INFARCTION DUE TO EMBOLISM OF BILATERAL MIDDLE CEREBRAL ARTERIES: ICD-10-CM

## 2019-03-22 DIAGNOSIS — Z98.49 CATARACT EXTRACTION STATUS, UNSPECIFIED EYE: Chronic | ICD-10-CM

## 2019-03-22 PROCEDURE — 71260 CT THORAX DX C+: CPT | Mod: 26

## 2019-03-22 PROCEDURE — 82565 ASSAY OF CREATININE: CPT

## 2019-03-22 PROCEDURE — 74177 CT ABD & PELVIS W/CONTRAST: CPT

## 2019-03-22 PROCEDURE — 71260 CT THORAX DX C+: CPT

## 2019-03-22 PROCEDURE — 74177 CT ABD & PELVIS W/CONTRAST: CPT | Mod: 26

## 2019-04-04 ENCOUNTER — APPOINTMENT (OUTPATIENT)
Dept: CARDIOLOGY | Facility: CLINIC | Age: 79
End: 2019-04-04
Payer: MEDICARE

## 2019-04-04 ENCOUNTER — NON-APPOINTMENT (OUTPATIENT)
Age: 79
End: 2019-04-04

## 2019-04-04 VITALS
HEIGHT: 72 IN | BODY MASS INDEX: 25.33 KG/M2 | DIASTOLIC BLOOD PRESSURE: 80 MMHG | WEIGHT: 187 LBS | OXYGEN SATURATION: 96 % | HEART RATE: 68 BPM | SYSTOLIC BLOOD PRESSURE: 171 MMHG

## 2019-04-04 VITALS — HEART RATE: 69 BPM | DIASTOLIC BLOOD PRESSURE: 76 MMHG | SYSTOLIC BLOOD PRESSURE: 155 MMHG | OXYGEN SATURATION: 98 %

## 2019-04-04 DIAGNOSIS — E78.5 HYPERLIPIDEMIA, UNSPECIFIED: ICD-10-CM

## 2019-04-04 DIAGNOSIS — I63.9 CEREBRAL INFARCTION, UNSPECIFIED: ICD-10-CM

## 2019-04-04 DIAGNOSIS — I63.12 CEREBRAL INFARCTION DUE TO EMBOLISM OF BASILAR ARTERY: ICD-10-CM

## 2019-04-04 DIAGNOSIS — R03.0 ELEVATED BLOOD-PRESSURE READING, W/OUT DIAGNOSIS OF HYPERTENSION: ICD-10-CM

## 2019-04-04 PROCEDURE — 99215 OFFICE O/P EST HI 40 MIN: CPT

## 2019-04-04 PROCEDURE — 93000 ELECTROCARDIOGRAM COMPLETE: CPT

## 2019-04-06 PROBLEM — E78.5 HYPERLIPIDEMIA: Status: ACTIVE | Noted: 2018-08-30

## 2019-04-06 PROBLEM — I63.9 CVA (CEREBRAL VASCULAR ACCIDENT): Status: ACTIVE | Noted: 2018-08-30

## 2019-04-16 ENCOUNTER — APPOINTMENT (OUTPATIENT)
Dept: ELECTROPHYSIOLOGY | Facility: CLINIC | Age: 79
End: 2019-04-16
Payer: MEDICARE

## 2019-04-16 DIAGNOSIS — I63.413 CEREBRAL INFARCTION DUE TO EMBOLISM OF BILATERAL MIDDLE CEREBRAL ARTERIES: ICD-10-CM

## 2019-04-16 PROCEDURE — 93299: CPT

## 2019-04-16 PROCEDURE — 93298 REM INTERROG DEV EVAL SCRMS: CPT

## 2019-05-02 PROBLEM — I63.413 CEREBROVASCULAR ACCIDENT (CVA) DUE TO BILATERAL EMBOLISM OF MIDDLE CEREBRAL ARTERIES: Status: ACTIVE | Noted: 2018-10-15

## 2019-05-17 ENCOUNTER — APPOINTMENT (OUTPATIENT)
Dept: ELECTROPHYSIOLOGY | Facility: CLINIC | Age: 79
End: 2019-05-17
Payer: MEDICARE

## 2019-05-17 PROCEDURE — 93298 REM INTERROG DEV EVAL SCRMS: CPT

## 2019-05-17 PROCEDURE — 93299: CPT

## 2019-06-19 ENCOUNTER — APPOINTMENT (OUTPATIENT)
Dept: ELECTROPHYSIOLOGY | Facility: CLINIC | Age: 79
End: 2019-06-19
Payer: MEDICARE

## 2019-06-19 PROCEDURE — 93299: CPT

## 2019-06-19 PROCEDURE — 93298 REM INTERROG DEV EVAL SCRMS: CPT

## 2019-07-15 ENCOUNTER — RECORD ABSTRACTING (OUTPATIENT)
Age: 79
End: 2019-07-15

## 2019-07-23 ENCOUNTER — APPOINTMENT (OUTPATIENT)
Dept: ELECTROPHYSIOLOGY | Facility: CLINIC | Age: 79
End: 2019-07-23
Payer: MEDICARE

## 2019-07-23 PROCEDURE — 93298 REM INTERROG DEV EVAL SCRMS: CPT

## 2019-07-23 PROCEDURE — 93299: CPT

## 2019-08-28 ENCOUNTER — APPOINTMENT (OUTPATIENT)
Dept: ELECTROPHYSIOLOGY | Facility: CLINIC | Age: 79
End: 2019-08-28
Payer: MEDICARE

## 2019-08-28 PROCEDURE — 93299: CPT

## 2019-08-28 PROCEDURE — 93298 REM INTERROG DEV EVAL SCRMS: CPT

## 2019-09-27 ENCOUNTER — APPOINTMENT (OUTPATIENT)
Dept: NEUROLOGY | Facility: CLINIC | Age: 79
End: 2019-09-27
Payer: MEDICARE

## 2019-09-27 VITALS
WEIGHT: 180 LBS | HEART RATE: 62 BPM | HEIGHT: 72 IN | SYSTOLIC BLOOD PRESSURE: 180 MMHG | BODY MASS INDEX: 24.38 KG/M2 | DIASTOLIC BLOOD PRESSURE: 70 MMHG

## 2019-09-27 VITALS
BODY MASS INDEX: 24.38 KG/M2 | HEART RATE: 62 BPM | WEIGHT: 180 LBS | HEIGHT: 72 IN | DIASTOLIC BLOOD PRESSURE: 72 MMHG | SYSTOLIC BLOOD PRESSURE: 180 MMHG

## 2019-09-27 PROCEDURE — 99215 OFFICE O/P EST HI 40 MIN: CPT

## 2019-09-27 PROCEDURE — 93886 INTRACRANIAL COMPLETE STUDY: CPT

## 2019-09-27 PROCEDURE — 93880 EXTRACRANIAL BILAT STUDY: CPT

## 2019-09-27 PROCEDURE — 93892 TCD EMBOLI DETECT W/O INJ: CPT

## 2019-09-27 NOTE — HISTORY OF PRESENT ILLNESS
[FreeTextEntry1] : 78-year-old right-handed gentleman\par Summary from Dr. Dex Hussein's note dated 10/15/18-with modification\par He was admitted for stroke in 8/18.\par He presented to [facility] for an acute onset of right-hand weakness/clumsiness on 8/25/18.  On the morning of 8/27 around 7:30-7:40 AM, he  noticed acute onset of numbness of the right arm and shoulder lasting few hours with complete resolution subsequently. \par \par Since his discharge from the hospital, he reports complete resolution of his focal neurological symptoms/deficits. His current post-stroke mRS is reported to be 2 as he has not been able to perform all his activities as before his stroke but reports complete independence of ADLs. \par \par MRI brain showed acute/subacute small punctate embolic-looking infarcts involving multiple vascular distribution including bilateral MCAs, left AVLERIE and basilar artery. CTA head and neck is grossly unremarkable. TTE did not show any obvious structural cardiac source of embolism, but showed possible right to left shunt. CLAUDIA: Mitral valve prolapse, mild to moderate mitral regurgitation, mild non-mobile atherosclerotic plaque in the aortic arch and descending thoracic aorta,evidence of a PFO.\par Lower extremity duplex: No evidence of DVT      \par \par Impression:\par Cerebral embolism with cerebral infarction. Stroke involving multiple vascular distributions including bilateral MCAs, left VALERIE and posterior circulation - likely etiology being embolic stroke of unknown source, possibly cardioembolism or paradoxical embolism. \par \par Additional Workup:\par LDL: 165 (8/18).\par \par Loop Recorder implanted  8/28/18.\par \par 3/15/19. He came to the office today. He reports no new or recurrent focal neurologic symptoms.\par \par He has stopped taking his clopidogrel because he had read that it can be associated with high blood pressure. He does not want to take aspirin because previously baby aspirin had been associated with tinnitus. He would also like to stop his losartan, because he "felt something that works better, hot pepper concentrate". He stated that his hypertension was "neurological" and he has been using a vagal nerve stimulator to treat his blood pressure.\par \par  Carotid Doppler (3/7/19) showed mild heterogeneous plaque on the right with less than 40% stenosis. The left carotid system was normal. The extracranial vertebral arteries showed antegrade flow with a normal resistance pattern.\par \par Transcranial Doppler (3/7/19) of the [place] of [place] was normal with no sign of stenosis.\par \par 9/27/19. Ruperto Came to the Office Today. He reports no new or recurrent focal neurologic symptoms.\par \par CT of chest/abdomen/pelvis (3/22/19) was unremarkable, with no evidence of malignancy.\par \par  Repeat Carotid Doppler (9/27/19) showed mild heterogeneous plaque on the right with less than 40% stenosis. The left carotid system was normal. Incidentally Noted was a calcified solid right thyroid nodule.  This Doppler Was Unchanged compared to the prior study of 3/19 except that a thyroid nodule was detected on the current study.\par \par Transcranial Doppler (9/27/19) of the [place] of [place] was normal with no sign of stenosis.\par

## 2019-09-27 NOTE — PHYSICAL EXAM
[FreeTextEntry1] : He looked generally well. Mental status was intact. He was alert, attentive and fully oriented. Speech was fluent and comprehension intact. Memory and fund of knowledge were normal. On cranial nerve exam, I was unable to see the fundi; slight flattening of right nasolabial fold; the remainder of cranial nerves II through XII was intact. On motor exam tone was normal. Mild postural tremor of his arms, increased with action.  There was a right arm pronator drift, but no downward drift. Fine finger movements were within normal limits. Power was normal throughout. Reflexes were 2+ throughout except the ankle jerks which were 1+-2+. Plantar reflexes were downgoing on the left and showed withdrawal on the right. Coordination and gait were normal. Tandem gait was normal. Romberg test was negative. Sensory exam was intact to all modalities.

## 2019-09-27 NOTE — DISCUSSION/SUMMARY
[FreeTextEntry1] : Summary from Dr. Dex Hussein's note dated 10/15/18-with modification\par He was admitted for stroke in 8/18.\par He presented to [facility] for an acute onset of right-hand weakness/clumsiness on 8/25/18. He  noticed acute onset of numbness of the right arm and shoulder lasting few hours with complete resolution subsequently. \par \par MRI brain showed acute/subacute small punctate embolic-looking infarcts involving multiple vascular distribution including bilateral MCAs, left VALERIE and basilar artery. The Remainder of his neurovascular workup was unremarkable except for a PFO. \par Impression: Stroke involving multiple vascular distributions i - likely etiology being embolic stroke of unknown source, possibly cardioembolism or paradoxical embolism. \par Plan: Clopidogrel  indefinitely \par \par 3/15/19. Overall he is neurologically intact.\par \par He had been started on clopidogrel, because baby aspirin previously was associated with tinnitus. He refuses to take the clopidogrel, because he had read that clopidogrel can increase blood pressure. He stated that he does not need any antithrombotic agents, because his "circulation is good". He feels that the hot pepper concentrate is an adequate antithrombotic agent without side effects. He also prefers not to take medication for his blood pressure, because his hypertension is "neurological" and he is using a vagal nerve stimulator for this. I did try to explain that I felt that the benefits of a medication such as clopidogrel would outweigh the risks, but he refused. I suggested that an alternative would be Aggrenox since the amount of aspirin is very low, and perhaps he would not experience tinnitus. He agreed to read about Aggrenox and consider it.\par \par Occult malignancy is in the differential diagnosis of scattered small infarcts, and I have requested CT scan of chest/abdomen/pelvis.\par \par 9/27/19. He Is Stable and doing well neurologically.\par \par CT of chest/abdomen/pelvis (3/22/19) was unremarkable, making occult malignancy unlikely.\par \par  His blood pressure was high in my office, and I emphasized to him the importance of controlling labile BP.  As he says, he will continue to try to find a clinician who has experience with vagal nerve modulation for blood pressure management. He will also be pursuing a combination of meditation and acupuncture for blood pressure management. He Does Not Wish to take additional medication.\par \par Doppler studies (9/27/19) showed an incidental thyroid nodule. He does not have a PCP and does not wish to obtain one or to further work up this finding.\par \par He can followup with me in approximately 6 months. I hope that he remains free of further serious trouble.\par \par \par

## 2019-09-27 NOTE — REVIEW OF SYSTEMS
[Recent Weight Loss (___ Lbs)] : recent [unfilled] ~Ulb weight loss [Cough] : cough [FreeTextEntry2] : deliberate weight loss [FreeTextEntry6] : recent cough

## 2019-10-01 ENCOUNTER — APPOINTMENT (OUTPATIENT)
Dept: ELECTROPHYSIOLOGY | Facility: CLINIC | Age: 79
End: 2019-10-01
Payer: MEDICARE

## 2019-10-01 PROCEDURE — 93299: CPT

## 2019-10-01 PROCEDURE — 93298 REM INTERROG DEV EVAL SCRMS: CPT

## 2019-11-04 ENCOUNTER — APPOINTMENT (OUTPATIENT)
Dept: ELECTROPHYSIOLOGY | Facility: CLINIC | Age: 79
End: 2019-11-04
Payer: MEDICARE

## 2019-11-04 PROCEDURE — 93298 REM INTERROG DEV EVAL SCRMS: CPT

## 2019-11-04 PROCEDURE — 93299: CPT

## 2019-12-06 ENCOUNTER — APPOINTMENT (OUTPATIENT)
Dept: ELECTROPHYSIOLOGY | Facility: CLINIC | Age: 79
End: 2019-12-06
Payer: MEDICARE

## 2019-12-06 PROCEDURE — 93299: CPT

## 2019-12-06 PROCEDURE — 93298 REM INTERROG DEV EVAL SCRMS: CPT

## 2020-01-06 ENCOUNTER — APPOINTMENT (OUTPATIENT)
Dept: ELECTROPHYSIOLOGY | Facility: CLINIC | Age: 80
End: 2020-01-06
Payer: MEDICARE

## 2020-01-06 PROCEDURE — G2066: CPT

## 2020-01-06 PROCEDURE — 93298 REM INTERROG DEV EVAL SCRMS: CPT

## 2020-02-06 ENCOUNTER — APPOINTMENT (OUTPATIENT)
Dept: ELECTROPHYSIOLOGY | Facility: CLINIC | Age: 80
End: 2020-02-06
Payer: MEDICARE

## 2020-02-06 PROCEDURE — 93298 REM INTERROG DEV EVAL SCRMS: CPT

## 2020-02-06 PROCEDURE — G2066: CPT

## 2020-03-09 ENCOUNTER — APPOINTMENT (OUTPATIENT)
Dept: ELECTROPHYSIOLOGY | Facility: CLINIC | Age: 80
End: 2020-03-09
Payer: MEDICARE

## 2020-03-09 PROCEDURE — G2066: CPT

## 2020-03-09 PROCEDURE — 93298 REM INTERROG DEV EVAL SCRMS: CPT

## 2020-03-13 ENCOUNTER — APPOINTMENT (OUTPATIENT)
Dept: NEUROLOGY | Facility: CLINIC | Age: 80
End: 2020-03-13
Payer: MEDICARE

## 2020-03-13 PROCEDURE — 93892 TCD EMBOLI DETECT W/O INJ: CPT

## 2020-03-13 PROCEDURE — 93880 EXTRACRANIAL BILAT STUDY: CPT

## 2020-03-13 PROCEDURE — 93886 INTRACRANIAL COMPLETE STUDY: CPT

## 2020-03-14 ENCOUNTER — RECORD ABSTRACTING (OUTPATIENT)
Age: 80
End: 2020-03-14

## 2020-03-17 NOTE — REVIEW OF SYSTEMS
[Recent Weight Loss (___ Lbs)] : recent [unfilled] ~Ulb weight loss [Cough] : cough [Negative] : Heme/Lymph [FreeTextEntry2] : deliberate weight loss [FreeTextEntry6] : recent cough

## 2020-03-17 NOTE — PHYSICAL EXAM
[General Appearance - Alert] : alert [General Appearance - In No Acute Distress] : in no acute distress [Oriented To Time, Place, And Person] : oriented to person, place, and time [Impaired Insight] : insight and judgment were intact [Affect] : the affect was normal [Sclera] : the sclera and conjunctiva were normal [PERRL With Normal Accommodation] : pupils were equal in size, round, reactive to light, with normal accommodation [Extraocular Movements] : extraocular movements were intact [Outer Ear] : the ears and nose were normal in appearance [Oropharynx] : the oropharynx was normal [Neck Appearance] : the appearance of the neck was normal [Neck Cervical Mass (___cm)] : no neck mass was observed [Jugular Venous Distention Increased] : there was no jugular-venous distention [Thyroid Diffuse Enlargement] : the thyroid was not enlarged [Thyroid Nodule] : there were no palpable thyroid nodules [Auscultation Breath Sounds / Voice Sounds] : lungs were clear to auscultation bilaterally [Heart Rate And Rhythm] : heart rate was normal and rhythm regular [Heart Sounds] : normal S1 and S2 [Heart Sounds Gallop] : no gallops [Murmurs] : no murmurs [Heart Sounds Pericardial Friction Rub] : no pericardial rub [Arterial Pulses Carotid] : carotid pulses were normal with no bruits [Edema] : there was no peripheral edema [Veins - Varicosity Changes] : there were no varicosital changes [No CVA Tenderness] : no ~M costovertebral angle tenderness [No Spinal Tenderness] : no spinal tenderness [Abnormal Walk] : normal gait [Nail Clubbing] : no clubbing  or cyanosis of the fingernails [Musculoskeletal - Swelling] : no joint swelling seen [Motor Tone] : muscle strength and tone were normal [Skin Color & Pigmentation] : normal skin color and pigmentation [Skin Turgor] : normal skin turgor [] : no rash [FreeTextEntry1] : He looked generally well. Mental status was intact. He was alert, attentive and fully oriented. Speech was fluent and comprehension intact. Memory and fund of knowledge were normal. On cranial nerve exam, I was unable to see the fundi; slight flattening of right nasolabial fold; the remainder of cranial nerves II through XII was intact. On motor exam tone was normal. Mild postural tremor of his arms, increased with action.  There was a right arm pronator drift, but no downward drift. Fine finger movements were within normal limits. Power was normal throughout. Reflexes were 2+ throughout except the ankle jerks which were 1+-2+. Plantar reflexes were downgoing on the left and showed withdrawal on the right. Coordination and gait were normal. Tandem gait was normal. Romberg test was negative. Sensory exam was intact to all modalities.

## 2020-03-17 NOTE — HISTORY OF PRESENT ILLNESS
[FreeTextEntry1] : 79-year-old right-handed gentleman\par Summary from Dr. Dex Hussein's note dated 10/15/18-with modification\par He was admitted for stroke in 8/18.\par He presented to [facility] for an acute onset of right-hand weakness/clumsiness on 8/25/18.  On the morning of 8/27 around 7:30-7:40 AM, he  noticed acute onset of numbness of the right arm and shoulder lasting few hours with complete resolution subsequently. \par \par Since his discharge from the hospital, he reports complete resolution of his focal neurological symptoms/deficits. His current post-stroke mRS is reported to be 2 as he has not been able to perform all his activities as before his stroke but reports complete independence of ADLs. \par \par MRI brain showed acute/subacute small punctate embolic-looking infarcts involving multiple vascular distribution including bilateral MCAs, left VALERIE and basilar artery. CTA head and neck is grossly unremarkable. TTE did not show any obvious structural cardiac source of embolism, but showed possible right to left shunt. CLAUDIA: Mitral valve prolapse, mild to moderate mitral regurgitation, mild non-mobile atherosclerotic plaque in the aortic arch and descending thoracic aorta,evidence of a PFO.\par Lower extremity duplex: No evidence of DVT      \par \par Impression:\par Cerebral embolism with cerebral infarction. Stroke involving multiple vascular distributions including bilateral MCAs, left VALERIE and posterior circulation - likely etiology being embolic stroke of unknown source, possibly cardioembolism or paradoxical embolism. \par \par Additional Workup:\par LDL: 165 (8/18).\par \par Loop Recorder implanted  8/28/18.\par \par 3/15/19. He came to the office today. He reports no new or recurrent focal neurologic symptoms.\par \par He has stopped taking his clopidogrel because he had read that it can be associated with high blood pressure. He does not want to take aspirin because previously baby aspirin had been associated with tinnitus. He would also like to stop his losartan, because he "felt something that works better, hot pepper concentrate". He stated that his hypertension was "neurological" and he has been using a vagal nerve stimulator to treat his blood pressure.\par \par  Carotid Doppler (3/7/19) showed mild heterogeneous plaque on the right with less than 40% stenosis. The left carotid system was normal. The extracranial vertebral arteries showed antegrade flow with a normal resistance pattern.\par \par Transcranial Doppler (3/7/19) of the [place] was normal with no sign of stenosis.\par \par 9/27/19. Ruperto Came to the Office Today. He reports no new or recurrent focal neurologic symptoms.\par \par CT of chest/abdomen/pelvis (3/22/19) was unremarkable, with no evidence of malignancy.\par \par  Repeat Carotid Doppler (9/27/19) showed mild heterogeneous plaque on the right with less than 40% stenosis. The left carotid system was normal. Incidentally Noted was a calcified solid right thyroid nodule.  This Doppler Was Unchanged compared to the prior study of 3/19 except that a thyroid nodule was detected on the current study.\par \par Transcranial Doppler (9/27/19) of the [place] was normal with no sign of stenosis.\par \par New date\par \par Repeat carotid Doppler (3/13/2020) showed mild heterogeneous plaque on the right with less than 40% stenosis.  The left carotid system was normal.  Incidentally noted was a right sided calcified thyroid nodule.  This Doppler showed no significant change compared to the prior study of 9/19.\par \par Repeat transcranial Doppler (3/13/2020) of the [place] was normal.\par

## 2020-03-17 NOTE — DISCUSSION/SUMMARY
[FreeTextEntry1] : Summary from Dr. Dex Hussein's note dated 10/15/18-with modification\par He was admitted for stroke in 8/18.\par He presented to [facility] for an acute onset of right-hand weakness/clumsiness on 8/25/18. He  noticed acute onset of numbness of the right arm and shoulder lasting few hours with complete resolution subsequently. \par \par MRI brain showed acute/subacute small punctate embolic-looking infarcts involving multiple vascular distribution including bilateral MCAs, left VALERIE and basilar artery. The Remainder of his neurovascular workup was unremarkable except for a PFO. \par Impression: Stroke involving multiple vascular distributions i - likely etiology being embolic stroke of unknown source, possibly cardioembolism or paradoxical embolism. \par Plan: Clopidogrel  indefinitely \par \par 3/15/19. Overall he is neurologically intact.\par \par He had been started on clopidogrel, because baby aspirin previously was associated with tinnitus. He refuses to take the clopidogrel, because he had read that clopidogrel can increase blood pressure. He stated that he does not need any antithrombotic agents, because his "circulation is good". He feels that the hot pepper concentrate is an adequate antithrombotic agent without side effects. He also prefers not to take medication for his blood pressure, because his hypertension is "neurological" and he is using a vagal nerve stimulator for this. I did try to explain that I felt that the benefits of a medication such as clopidogrel would outweigh the risks, but he refused. I suggested that an alternative would be Aggrenox since the amount of aspirin is very low, and perhaps he would not experience tinnitus. He agreed to read about Aggrenox and consider it.\par \par Occult malignancy is in the differential diagnosis of scattered small infarcts, and I have requested CT scan of chest/abdomen/pelvis.\par \par 9/27/19. He Is Stable and doing well neurologically.\par \par CT of chest/abdomen/pelvis (3/22/19) was unremarkable, making occult malignancy unlikely.\par \par  His blood pressure was high in my office, and I emphasized to him the importance of controlling labile BP.  As he says, he will continue to try to find a clinician who has experience with vagal nerve modulation for blood pressure management. He will also be pursuing a combination of meditation and acupuncture for blood pressure management. He Does Not Wish to take additional medication.\par \par Doppler studies (9/27/19) showed an incidental thyroid nodule. He does not have a PCP and does not wish to obtain one or to further work up this finding.\par \par New date\par \par He can followup with me \par in approximately 6 months. I hope that he remains free of further serious trouble.\par \par \par

## 2020-03-23 ENCOUNTER — APPOINTMENT (OUTPATIENT)
Dept: NEUROLOGY | Facility: CLINIC | Age: 80
End: 2020-03-23

## 2020-04-09 ENCOUNTER — APPOINTMENT (OUTPATIENT)
Dept: ELECTROPHYSIOLOGY | Facility: CLINIC | Age: 80
End: 2020-04-09
Payer: MEDICARE

## 2020-04-09 PROCEDURE — G2066: CPT

## 2020-04-09 PROCEDURE — 93298 REM INTERROG DEV EVAL SCRMS: CPT

## 2020-05-11 ENCOUNTER — APPOINTMENT (OUTPATIENT)
Dept: ELECTROPHYSIOLOGY | Facility: CLINIC | Age: 80
End: 2020-05-11
Payer: MEDICARE

## 2020-05-11 PROCEDURE — G2066: CPT

## 2020-05-11 PROCEDURE — 93298 REM INTERROG DEV EVAL SCRMS: CPT

## 2020-05-22 ENCOUNTER — RX RENEWAL (OUTPATIENT)
Age: 80
End: 2020-05-22

## 2020-06-11 ENCOUNTER — APPOINTMENT (OUTPATIENT)
Dept: ELECTROPHYSIOLOGY | Facility: CLINIC | Age: 80
End: 2020-06-11
Payer: MEDICARE

## 2020-06-11 PROCEDURE — 93298 REM INTERROG DEV EVAL SCRMS: CPT

## 2020-06-11 PROCEDURE — G2066: CPT

## 2020-07-16 ENCOUNTER — APPOINTMENT (OUTPATIENT)
Dept: ELECTROPHYSIOLOGY | Facility: CLINIC | Age: 80
End: 2020-07-16
Payer: MEDICARE

## 2020-07-16 PROCEDURE — 93298 REM INTERROG DEV EVAL SCRMS: CPT

## 2020-07-16 PROCEDURE — G2066: CPT

## 2020-08-18 ENCOUNTER — APPOINTMENT (OUTPATIENT)
Dept: ELECTROPHYSIOLOGY | Facility: CLINIC | Age: 80
End: 2020-08-18
Payer: MEDICARE

## 2020-08-18 PROCEDURE — G2066: CPT

## 2020-08-18 PROCEDURE — 93298 REM INTERROG DEV EVAL SCRMS: CPT

## 2020-09-21 ENCOUNTER — APPOINTMENT (OUTPATIENT)
Dept: ELECTROPHYSIOLOGY | Facility: CLINIC | Age: 80
End: 2020-09-21
Payer: MEDICARE

## 2020-09-21 PROCEDURE — 93298 REM INTERROG DEV EVAL SCRMS: CPT

## 2020-09-21 PROCEDURE — G2066: CPT

## 2020-10-27 ENCOUNTER — APPOINTMENT (OUTPATIENT)
Dept: ELECTROPHYSIOLOGY | Facility: CLINIC | Age: 80
End: 2020-10-27
Payer: MEDICARE

## 2020-10-27 PROCEDURE — 93298 REM INTERROG DEV EVAL SCRMS: CPT

## 2020-10-27 PROCEDURE — G2066: CPT

## 2020-11-12 NOTE — ED ADULT NURSE NOTE - FACIAL SYMMETRY
Occupational Therapy  Visit Type: initial evaluation  Precautions:  Medical precautions:  seizure precautions and fall risk;. Fall risk due to balance impairments, limited insight into deficits    Lines:     Basic: IV, telemetry, continuous pulse oximetry, urinary catheter and complex lines    Complex: VEEG      Lines in chart and on patient reviewed, cautions maintained throughout session.  Hearing: no hearing deficits  Vision:     Current vision: no visual deficits  Safety Measures: chair alarm    SUBJECTIVE                                                                                                            Patient agreed to participate in therapy this date.  RnMaribell, aware of angelo  Patient / Family Goal: return to previous functional status      OBJECTIVE                                                                                                                Oriented to person, place, time and situation     Arousal alertness: appropriate responses to stimuli  Patient activity tolerance: 1 to 1 activity to rest  Functional Communication/Cognition    Overall status:  Impaired    Form of communication:  Verbal     Attention span:  Attends with cues to redirect    Attention Span Impairment: impulsivity    Commands: follows one step commands with increased time.    Transition between tasks: transition with cues.    Safety judgement: decreased awareness of need for assistance.    Awareness of deficits: assistance required to compensate for deficits.    [place]:  Purposeful/appropriate (stand by assistance on request)  Hand Dominance: right  Range of Motion (measured in degrees unless otherwise indicated)  WNL: LUE, RUE, except as noted  ROM Details: Pt reports L shoulder flexion limitations reports \"arm gets dislocated.\" Pt reports pain with movement   Balance  Sitting:    Static:  supervision     Dynamic:  supervision  Standing - Firm Surface - Eyes Open:     Static: moderate assist     Dynamic:  moderate assist  Balance Details: Pt required moderate assist for functional balance due to weakness and limited activity tolerance      Transfers:    Assistive devices: gait belt and 2 person    Sit to stand: minimal assist    Stand to sit: minimal assist  Functional Ambulation:    Assistance:moderate assist   Assistive device:2 person    Distance (ft): 10  Details/Comments: Pt requires minimal to moderate assist and additional person present for line management. Pt with 2 significant Lob requiring assist to recover. Pt demonstrates limited insight into deficits requiring moderate cues for safety  Activities of Daily Living (ADLs):  Grooming/Oral Hygiene:     Grooming assist: minimal assist  Lower Body Dressing:     Assist: minimal assist    Footwear assistance: minimal assist  Activities of daily living training:   Steadying assist provided for all self care tasks due to balance deficits      Interventions                                                                                                                 Skilled input: verbal instruction/cues  Verbal Consent: Writer verbally educated and received verbal consent for hand placement, positioning of patient, and techniques to be performed today from patient for clothing adjustments for techniques and therapist position for techniques as described above and how they are pertinent to the patient's plan of care.        ASSESSMENT                                                                                                                Impairments: abnormal tone, activity tolerance, balance, coordination/proprioception, decreased insight into deficits, range of motion, safety awareness and strength  Functional Limitations: bed mobility, functional mobility, grooming, bathing, toileting, functional transfers and participating in meaningful/purposeful activities  Personal Occupations Profile Affected: bathing/showering, functional  mobility/transfers, personal hygiene/grooming, toileting/toilet hygiene, lower body dressing, upper body dressing, health management/maintenance, home establishment/managements, safety/emergency maintenance    Pt is a 33 year old male admitted due to breakthrough seizures. 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    Prior to admission pt reports living independent with two younger brothers in a lower duplex with 5 steps to enter. Pt reports one brother can be home all the time with him to provide supervision.     Pt presents below baseline requiring moderate assist for functional balance and minimal to moderate assist for self care tasks. Pt would benefit from IRP refferal to progress safety and independence prior to return home  Discharge Recommendations  Recommendations for Discharge: OT WI: Intensive therapy program               OT Identified Barriers to Discharge: balance weakness     Skilled therapy is required to address these limitations in attempt to maximize the patient's independence.  Clinical decision making: Moderate - Patient has several limitations (3-5), comorbidities and/or complexities, as noted in detailed assessment above, that impact their occupational profile.  Resulting in several treatment options and minimal to moderate task modification consistent with moderate clinical decision making complexity.    PLAN                                                                                                                          Suggestions for next session as indicated: Aide for safety with mobility due to limited insight, full body self cares and dressing with emphasis on functional cognition, functional balance, PASS medication task      Frequency Comments: M-F                GOALS:  Review Date: 11/19/2020  Long Term Goals: (to be  met by time of discharge from hospital)  Feeding: Patient will complete feeding tasks modified independent.  Grooming: Patient will complete grooming tasks modified independent.  Upper body dressing: Patient will complete upper body dressing modified independent.  Lower body dressing: Patient will complete lower body dressing modified independent.  Toileting: Patient will complete toileting modified independent.  Bathing: Patient will complete bathingmodified independent Toilet transfer: Patient will complete toilet transfer with modified independent.   Tub/shower transfer: Patient will complete tub/shower transfer with modified independent.   Home setting transfer: Patient will complete home setting transfers with modified independent.         Documented in the chart in the following areas: Pain. Assessment. Plan.       symmetrical

## 2020-12-01 ENCOUNTER — APPOINTMENT (OUTPATIENT)
Dept: ELECTROPHYSIOLOGY | Facility: CLINIC | Age: 80
End: 2020-12-01
Payer: MEDICARE

## 2020-12-01 PROCEDURE — G2066: CPT

## 2020-12-01 PROCEDURE — 93298 REM INTERROG DEV EVAL SCRMS: CPT

## 2021-01-05 ENCOUNTER — APPOINTMENT (OUTPATIENT)
Dept: ELECTROPHYSIOLOGY | Facility: CLINIC | Age: 81
End: 2021-01-05
Payer: MEDICARE

## 2021-01-05 PROCEDURE — G2066: CPT

## 2021-01-05 PROCEDURE — 93298 REM INTERROG DEV EVAL SCRMS: CPT

## 2021-02-09 ENCOUNTER — APPOINTMENT (OUTPATIENT)
Dept: ELECTROPHYSIOLOGY | Facility: CLINIC | Age: 81
End: 2021-02-09
Payer: MEDICARE

## 2021-02-09 PROCEDURE — G2066: CPT

## 2021-02-09 PROCEDURE — 93298 REM INTERROG DEV EVAL SCRMS: CPT

## 2021-02-28 ENCOUNTER — NON-APPOINTMENT (OUTPATIENT)
Age: 81
End: 2021-02-28

## 2021-03-16 ENCOUNTER — NON-APPOINTMENT (OUTPATIENT)
Age: 81
End: 2021-03-16

## 2021-03-16 ENCOUNTER — APPOINTMENT (OUTPATIENT)
Dept: ELECTROPHYSIOLOGY | Facility: CLINIC | Age: 81
End: 2021-03-16
Payer: MEDICARE

## 2021-03-16 PROCEDURE — 93298 REM INTERROG DEV EVAL SCRMS: CPT

## 2021-03-16 PROCEDURE — G2066: CPT

## 2021-04-20 ENCOUNTER — NON-APPOINTMENT (OUTPATIENT)
Age: 81
End: 2021-04-20

## 2021-04-20 ENCOUNTER — APPOINTMENT (OUTPATIENT)
Dept: ELECTROPHYSIOLOGY | Facility: CLINIC | Age: 81
End: 2021-04-20
Payer: MEDICARE

## 2021-04-20 PROCEDURE — G2066: CPT

## 2021-04-20 PROCEDURE — 93298 REM INTERROG DEV EVAL SCRMS: CPT

## 2021-05-21 ENCOUNTER — RX RENEWAL (OUTPATIENT)
Age: 81
End: 2021-05-21

## 2021-05-21 RX ORDER — LOSARTAN POTASSIUM 25 MG/1
25 TABLET, FILM COATED ORAL DAILY
Qty: 90 | Refills: 3 | Status: ACTIVE | COMMUNITY
Start: 2018-10-18 | End: 1900-01-01

## 2021-05-25 ENCOUNTER — NON-APPOINTMENT (OUTPATIENT)
Age: 81
End: 2021-05-25

## 2021-05-25 ENCOUNTER — APPOINTMENT (OUTPATIENT)
Dept: ELECTROPHYSIOLOGY | Facility: CLINIC | Age: 81
End: 2021-05-25
Payer: MEDICARE

## 2021-05-25 PROCEDURE — G2066: CPT

## 2021-05-25 PROCEDURE — 93298 REM INTERROG DEV EVAL SCRMS: CPT

## 2021-06-29 ENCOUNTER — APPOINTMENT (OUTPATIENT)
Dept: ELECTROPHYSIOLOGY | Facility: CLINIC | Age: 81
End: 2021-06-29
Payer: MEDICARE

## 2021-06-29 ENCOUNTER — NON-APPOINTMENT (OUTPATIENT)
Age: 81
End: 2021-06-29

## 2021-06-29 PROCEDURE — 93298 REM INTERROG DEV EVAL SCRMS: CPT

## 2021-06-29 PROCEDURE — G2066: CPT

## 2021-08-03 ENCOUNTER — APPOINTMENT (OUTPATIENT)
Dept: ELECTROPHYSIOLOGY | Facility: CLINIC | Age: 81
End: 2021-08-03
Payer: MEDICARE

## 2021-08-03 ENCOUNTER — NON-APPOINTMENT (OUTPATIENT)
Age: 81
End: 2021-08-03

## 2021-08-03 PROCEDURE — 93298 REM INTERROG DEV EVAL SCRMS: CPT

## 2021-08-03 PROCEDURE — G2066: CPT

## 2021-09-07 ENCOUNTER — NON-APPOINTMENT (OUTPATIENT)
Age: 81
End: 2021-09-07

## 2021-09-07 ENCOUNTER — APPOINTMENT (OUTPATIENT)
Dept: ELECTROPHYSIOLOGY | Facility: CLINIC | Age: 81
End: 2021-09-07
Payer: MEDICARE

## 2021-09-07 PROCEDURE — 93298 REM INTERROG DEV EVAL SCRMS: CPT

## 2021-09-07 PROCEDURE — G2066: CPT

## 2021-10-12 ENCOUNTER — NON-APPOINTMENT (OUTPATIENT)
Age: 81
End: 2021-10-12

## 2021-10-12 ENCOUNTER — APPOINTMENT (OUTPATIENT)
Dept: ELECTROPHYSIOLOGY | Facility: CLINIC | Age: 81
End: 2021-10-12
Payer: MEDICARE

## 2021-10-12 PROCEDURE — 93298 REM INTERROG DEV EVAL SCRMS: CPT

## 2021-10-12 PROCEDURE — G2066: CPT | Mod: NC

## 2021-11-16 ENCOUNTER — APPOINTMENT (OUTPATIENT)
Dept: ELECTROPHYSIOLOGY | Facility: CLINIC | Age: 81
End: 2021-11-16
Payer: MEDICARE

## 2021-11-16 ENCOUNTER — NON-APPOINTMENT (OUTPATIENT)
Age: 81
End: 2021-11-16

## 2021-11-16 PROCEDURE — 93298 REM INTERROG DEV EVAL SCRMS: CPT

## 2021-11-16 PROCEDURE — G2066: CPT

## 2022-01-25 ENCOUNTER — APPOINTMENT (OUTPATIENT)
Dept: ELECTROPHYSIOLOGY | Facility: CLINIC | Age: 82
End: 2022-01-25
Payer: MEDICARE

## 2022-01-25 ENCOUNTER — NON-APPOINTMENT (OUTPATIENT)
Age: 82
End: 2022-01-25

## 2022-01-25 PROCEDURE — 93298 REM INTERROG DEV EVAL SCRMS: CPT

## 2022-01-25 PROCEDURE — G2066: CPT

## 2022-04-05 ENCOUNTER — APPOINTMENT (OUTPATIENT)
Dept: ELECTROPHYSIOLOGY | Facility: CLINIC | Age: 82
End: 2022-04-05

## 2022-04-07 ENCOUNTER — FORM ENCOUNTER (OUTPATIENT)
Age: 82
End: 2022-04-07

## 2022-04-18 ENCOUNTER — NON-APPOINTMENT (OUTPATIENT)
Age: 82
End: 2022-04-18

## 2022-05-06 ENCOUNTER — APPOINTMENT (OUTPATIENT)
Dept: ELECTROPHYSIOLOGY | Facility: CLINIC | Age: 82
End: 2022-05-06

## 2022-08-04 NOTE — ED CDU PROVIDER INITIAL DAY NOTE - INDICATION FOR OBSERVATION
[facility] Call Center    Phone Message    May a detailed message be left on voicemail: yes     Reason for Call: Dr. Nogueira has questions about patient    Action Taken: Other: urology    Travel Screening: Not Applicable                                                                       Diagnostic Uncertainty

## 2022-10-03 NOTE — CONSULT NOTE ADULT - CONSULT REQUESTED DATE/TIME
27-Aug-2018 Double O-Z Flap Text: The defect edges were debeveled with a #15 scalpel blade.  Given the location of the defect, shape of the defect and the proximity to free margins a Double O-Z flap was deemed most appropriate.  Using a sterile surgical marker, an appropriate transposition flap was drawn incorporating the defect and placing the expected incisions within the relaxed skin tension lines where possible. The area thus outlined was incised deep to adipose tissue with a #15 scalpel blade.  The skin margins were undermined to an appropriate distance in all directions utilizing iris scissors.

## 2022-10-20 ENCOUNTER — OFFICE (OUTPATIENT)
Dept: URBAN - METROPOLITAN AREA CLINIC 77 | Facility: CLINIC | Age: 82
Setting detail: OPHTHALMOLOGY
End: 2022-10-20
Payer: MEDICARE

## 2022-10-20 DIAGNOSIS — Z96.1: ICD-10-CM

## 2022-10-20 DIAGNOSIS — H26.493: ICD-10-CM

## 2022-10-20 DIAGNOSIS — T85.22XA: ICD-10-CM

## 2022-10-20 PROCEDURE — 92004 COMPRE OPH EXAM NEW PT 1/>: CPT | Performed by: OPHTHALMOLOGY

## 2022-10-20 ASSESSMENT — VISUAL ACUITY
OD_BCVA: 20/25
OS_BCVA: 20/30

## 2022-10-20 ASSESSMENT — KERATOMETRY
OD_AXISANGLE_DEGREES: 177
OD_K1POWER_DIOPTERS: 43.75
OD_K2POWER_DIOPTERS: 45.25
OS_K2POWER_DIOPTERS: 47.00
OS_AXISANGLE_DEGREES: 161
OS_K1POWER_DIOPTERS: 43.25

## 2022-10-20 ASSESSMENT — CONFRONTATIONAL VISUAL FIELD TEST (CVF)
OS_FINDINGS: FULL
OD_FINDINGS: FULL

## 2022-12-22 ENCOUNTER — INPATIENT (INPATIENT)
Facility: HOSPITAL | Age: 82
LOS: 1 days | Discharge: HOME | End: 2022-12-24
Attending: INTERNAL MEDICINE | Admitting: INTERNAL MEDICINE

## 2022-12-22 VITALS
DIASTOLIC BLOOD PRESSURE: 81 MMHG | WEIGHT: 177.91 LBS | RESPIRATION RATE: 18 BRPM | SYSTOLIC BLOOD PRESSURE: 189 MMHG | OXYGEN SATURATION: 99 % | TEMPERATURE: 99 F | HEART RATE: 76 BPM

## 2022-12-22 DIAGNOSIS — Z98.49 CATARACT EXTRACTION STATUS, UNSPECIFIED EYE: Chronic | ICD-10-CM

## 2022-12-22 LAB
ANION GAP SERPL CALC-SCNC: 8 MMOL/L — SIGNIFICANT CHANGE UP (ref 5–17)
BASOPHILS # BLD AUTO: 0.04 K/UL — SIGNIFICANT CHANGE UP (ref 0–0.2)
BASOPHILS NFR BLD AUTO: 0.4 % — SIGNIFICANT CHANGE UP (ref 0–2)
BUN SERPL-MCNC: 29 MG/DL — HIGH (ref 7–23)
CALCIUM SERPL-MCNC: 9.7 MG/DL — SIGNIFICANT CHANGE UP (ref 8.5–10.1)
CHLORIDE SERPL-SCNC: 111 MMOL/L — HIGH (ref 96–108)
CO2 SERPL-SCNC: 24 MMOL/L — SIGNIFICANT CHANGE UP (ref 22–31)
CREAT SERPL-MCNC: 1 MG/DL — SIGNIFICANT CHANGE UP (ref 0.5–1.3)
EGFR: 76 ML/MIN/1.73M2 — SIGNIFICANT CHANGE UP
EOSINOPHIL # BLD AUTO: 0.09 K/UL — SIGNIFICANT CHANGE UP (ref 0–0.5)
EOSINOPHIL NFR BLD AUTO: 0.8 % — SIGNIFICANT CHANGE UP (ref 0–6)
ERYTHROCYTE [SEDIMENTATION RATE] IN BLOOD: 38 MM/HR — HIGH (ref 0–20)
FLUAV AG NPH QL: SIGNIFICANT CHANGE UP
FLUBV AG NPH QL: SIGNIFICANT CHANGE UP
GLUCOSE SERPL-MCNC: 108 MG/DL — HIGH (ref 70–99)
HCT VFR BLD CALC: 38.9 % — LOW (ref 39–50)
HGB BLD-MCNC: 13.1 G/DL — SIGNIFICANT CHANGE UP (ref 13–17)
IMM GRANULOCYTES NFR BLD AUTO: 0.3 % — SIGNIFICANT CHANGE UP (ref 0–0.9)
LYMPHOCYTES # BLD AUTO: 1.44 K/UL — SIGNIFICANT CHANGE UP (ref 1–3.3)
LYMPHOCYTES # BLD AUTO: 13.3 % — SIGNIFICANT CHANGE UP (ref 13–44)
MCHC RBC-ENTMCNC: 33.7 G/DL — SIGNIFICANT CHANGE UP (ref 32–36)
MCHC RBC-ENTMCNC: 33.7 PG — SIGNIFICANT CHANGE UP (ref 27–34)
MCV RBC AUTO: 100 FL — SIGNIFICANT CHANGE UP (ref 80–100)
MONOCYTES # BLD AUTO: 1.06 K/UL — HIGH (ref 0–0.9)
MONOCYTES NFR BLD AUTO: 9.8 % — SIGNIFICANT CHANGE UP (ref 2–14)
NEUTROPHILS # BLD AUTO: 8.2 K/UL — HIGH (ref 1.8–7.4)
NEUTROPHILS NFR BLD AUTO: 75.4 % — SIGNIFICANT CHANGE UP (ref 43–77)
NRBC # BLD: 0 /100 WBCS — SIGNIFICANT CHANGE UP (ref 0–0)
PLATELET # BLD AUTO: 208 K/UL — SIGNIFICANT CHANGE UP (ref 150–400)
POTASSIUM SERPL-MCNC: 4.6 MMOL/L — SIGNIFICANT CHANGE UP (ref 3.5–5.3)
POTASSIUM SERPL-SCNC: 4.6 MMOL/L — SIGNIFICANT CHANGE UP (ref 3.5–5.3)
RBC # BLD: 3.89 M/UL — LOW (ref 4.2–5.8)
RBC # FLD: 12.7 % — SIGNIFICANT CHANGE UP (ref 10.3–14.5)
SARS-COV-2 RNA SPEC QL NAA+PROBE: SIGNIFICANT CHANGE UP
SODIUM SERPL-SCNC: 143 MMOL/L — SIGNIFICANT CHANGE UP (ref 135–145)
WBC # BLD: 10.86 K/UL — HIGH (ref 3.8–10.5)
WBC # FLD AUTO: 10.86 K/UL — HIGH (ref 3.8–10.5)

## 2022-12-22 PROCEDURE — 99285 EMERGENCY DEPT VISIT HI MDM: CPT

## 2022-12-22 PROCEDURE — 73630 X-RAY EXAM OF FOOT: CPT | Mod: 26,LT

## 2022-12-22 PROCEDURE — 99223 1ST HOSP IP/OBS HIGH 75: CPT

## 2022-12-22 PROCEDURE — 73610 X-RAY EXAM OF ANKLE: CPT | Mod: 26,LT

## 2022-12-22 RX ORDER — ACETAMINOPHEN 500 MG
650 TABLET ORAL ONCE
Refills: 0 | Status: COMPLETED | OUTPATIENT
Start: 2022-12-22 | End: 2022-12-22

## 2022-12-22 RX ORDER — ATORVASTATIN CALCIUM 80 MG/1
80 TABLET, FILM COATED ORAL AT BEDTIME
Refills: 0 | Status: DISCONTINUED | OUTPATIENT
Start: 2022-12-22 | End: 2022-12-24

## 2022-12-22 RX ORDER — LANOLIN ALCOHOL/MO/W.PET/CERES
3 CREAM (GRAM) TOPICAL AT BEDTIME
Refills: 0 | Status: DISCONTINUED | OUTPATIENT
Start: 2022-12-22 | End: 2022-12-24

## 2022-12-22 RX ORDER — CEFEPIME 1 G/1
2000 INJECTION, POWDER, FOR SOLUTION INTRAMUSCULAR; INTRAVENOUS EVERY 12 HOURS
Refills: 0 | Status: DISCONTINUED | OUTPATIENT
Start: 2022-12-23 | End: 2022-12-24

## 2022-12-22 RX ORDER — CLOPIDOGREL BISULFATE 75 MG/1
75 TABLET, FILM COATED ORAL DAILY
Refills: 0 | Status: DISCONTINUED | OUTPATIENT
Start: 2022-12-22 | End: 2022-12-23

## 2022-12-22 RX ORDER — ASPIRIN/CALCIUM CARB/MAGNESIUM 324 MG
81 TABLET ORAL DAILY
Refills: 0 | Status: DISCONTINUED | OUTPATIENT
Start: 2022-12-22 | End: 2022-12-24

## 2022-12-22 RX ORDER — CEFEPIME 1 G/1
2000 INJECTION, POWDER, FOR SOLUTION INTRAMUSCULAR; INTRAVENOUS ONCE
Refills: 0 | Status: COMPLETED | OUTPATIENT
Start: 2022-12-22 | End: 2022-12-22

## 2022-12-22 RX ORDER — VANCOMYCIN HCL 1 G
1000 VIAL (EA) INTRAVENOUS ONCE
Refills: 0 | Status: COMPLETED | OUTPATIENT
Start: 2022-12-22 | End: 2022-12-23

## 2022-12-22 RX ORDER — ACETAMINOPHEN 500 MG
650 TABLET ORAL EVERY 6 HOURS
Refills: 0 | Status: DISCONTINUED | OUTPATIENT
Start: 2022-12-22 | End: 2022-12-24

## 2022-12-22 RX ORDER — VANCOMYCIN HCL 1 G
1000 VIAL (EA) INTRAVENOUS EVERY 12 HOURS
Refills: 0 | Status: DISCONTINUED | OUTPATIENT
Start: 2022-12-23 | End: 2022-12-24

## 2022-12-22 RX ADMIN — CEFEPIME 100 MILLIGRAM(S): 1 INJECTION, POWDER, FOR SOLUTION INTRAMUSCULAR; INTRAVENOUS at 21:43

## 2022-12-22 NOTE — H&P ADULT - NSICDXFAMILYHX_GEN_ALL_CORE_FT
FAMILY HISTORY:  Father  Still living? No  Family history of heart disease, Age at diagnosis: Age Unknown    Mother  Still living? No  Family history of mental disorder, Age at diagnosis: Age Unknown    
no difficulties

## 2022-12-22 NOTE — ED PROVIDER NOTE - OBJECTIVE STATEMENT
81 y/o M w/ PMH of HTN presenting w/ L foot pain. Seen w/ wife. Sent in by podiatrist. Initially stepped on sharp object on Tuesday, was seen outpatient. Had foreign body removed and was started on cipro. Had some redness and pain develop over the past few days. Saw podiatrist today who was concerned for developing cellulitis and sent pt to ED for eval. Denies fevers, chills, headache, dizziness, blurred vision, chest pain, cough, shortness of breath, abdominal pain, n/v/d/c, urinary symptoms, rash.

## 2022-12-22 NOTE — H&P ADULT - NSHPLABSRESULTS_GEN_ALL_CORE
12.5   9.22  )-----------( 210      ( 23 Dec 2022 08:00 )             38.5       12-23    142  |  110<H>  |  22  ----------------------------<  91  4.0   |  26  |  0.89    Ca    9.4      23 Dec 2022 08:00    TPro  6.7  /  Alb  2.9<L>  /  TBili  0.7  /  DBili  x   /  AST  25  /  ALT  22  /  AlkPhos  61  12-23

## 2022-12-22 NOTE — CONSULT NOTE ADULT - ASSESSMENT
82y Male with L foot cellulitis   - Patient seen and evaluated  - T(F): 98.7,WBC ,ESR ,CRP all pending   - L foot cellulitis to lower leg, previous foreign body removal site to plantar ball region of L foot noted, no pus, no drainage, no malodor. R foot no open wound.   - skin was prepped with alcohol pad then local anesthesia obtained about the previous foreign body removal site at the submet 1 area L foot using 5 cc 1% lidocaine plain. Verbal consent obtained, then bedside incision and drainage was performed via the previous foreign body removal site incision depth to muscle level and not beyond, extended proximal direction about 0.5 cm. NO pus drainage expressed, and no malodor noted. Irrigated wound with copious amount of sterile saline. Applied Steri strip, followed by DSD. Patient tolerated well with no other incidents.   - L foot Xray ordered and pending  - recommended medicine admission   - L foot wound culture obtained  - Recommend IV Vanco Zosyn   - recommended ID consult and follow up   - Stability of discharge from Podiatry standpoint is pending erythema resolution.   -  Discussed with attending.  82y Male with L foot cellulitis   - Patient seen and evaluated  - T(F): 98.7,WBC ,ESR ,CRP all pending   - L foot cellulitis to lower leg, previous foreign body removal site to plantar ball region of L foot noted, no pus, no drainage, no malodor. R foot no open wound.   - skin was prepped with alcohol pad then local anesthesia obtained about the previous foreign body removal site at the submet 1 area L foot using 5 cc 1% lidocaine plain. Verbal consent obtained, then bedside incision and drainage was performed via the previous foreign body removal site incision depth to muscle level and not beyond, extended proximal direction about 0.5 cm. NO pus drainage expressed, and no malodor noted. Irrigated wound with copious amount of sterile saline. Applied Steri strip, followed by DSD. Patient tolerated well with no other incidents.   - L foot Xray ordered and pending  - recommended medicine admission   - L foot wound culture obtained  - Recommend IV Vanco Cefepime   - recommended ID consult and follow up   - Stability of discharge from Podiatry standpoint is pending erythema resolution.   -  Discussed with attending.

## 2022-12-22 NOTE — CONSULT NOTE ADULT - SUBJECTIVE AND OBJECTIVE BOX
Patient is a 82y old  Male who presents with a chief complaint of L foot cellulitis     HPI:  pt monday stepped on possibly a tooth pick, Tuesday foreign body removal performed outpatient, redness progresses, sent in by DPM for further workup    PAST MEDICAL & SURGICAL HISTORY:  Meniscal injury  ~ left (no surgery)      S/P cataract surgery          MEDICATIONS  (STANDING):    MEDICATIONS  (PRN):      Allergies    penicillins (Unknown)    Intolerances        VITALS:    Vital Signs Last 24 Hrs  T(C): 37.1 (22 Dec 2022 18:36), Max: 37.1 (22 Dec 2022 16:47)  T(F): 98.7 (22 Dec 2022 18:36), Max: 98.7 (22 Dec 2022 16:47)  HR: 82 (22 Dec 2022 18:36) (76 - 82)  BP: 176/81 (22 Dec 2022 18:36) (176/81 - 189/81)  BP(mean): --  RR: 18 (22 Dec 2022 18:36) (18 - 18)  SpO2: 99% (22 Dec 2022 18:36) (99% - 99%)    Parameters below as of 22 Dec 2022 16:47  Patient On (Oxygen Delivery Method): room air        LABS:                CAPILLARY BLOOD GLUCOSE              LOWER EXTREMITY PHYSICAL EXAM:    Vascular: DP/PT 2/4, B/L, CFT <3 seconds B/L, Temperature gradient warm to warm L , warm to cool R.   Neuro: Epicritic sensation itnact to the level of digits, B/L.  Musculoskeletal/Ortho: unremarkable, BME performed .   Skin: L foot cellulitis to lower leg, previous foreign body removal site to plantar ball region of L foot noted, no pus, no drainage, no malodor. R foot no open wound.       RADIOLOGY & ADDITIONAL STUDIES:    pending

## 2022-12-22 NOTE — H&P ADULT - ASSESSMENT
#Cellulitis #R/o Osteomyelitis  - S/p puncture wound in foot  - Podiatry consulted by ED follow recs  - Start Vanc and Cefepime empirically  - F/u blood and wound cx  - F/u XR, MRI of foot to r/o osteomyeltitis  - ESR 38, lowers suspicion for osteomyelitis  - Please consult ID per podiatry recommendations  - F/u A1c    #CAD  - Continue aspirin, plavix, statin, possible PVD contributing in patient    #HTN  - Continue home losartan    Code: Full  VTE: None  Diet: Regular     #Cellulitis #R/o Osteomyelitis  - S/p puncture wound in foot  - Podiatry consulted by ED follow recs  - Start Vanc and Cefepime empirically  - F/u blood and wound cx  - F/u XR, MRI of foot to r/o osteomyeltitis  - ESR 38, lowers suspicion for osteomyelitis  - Please consult ID per podiatry recommendations  - F/u A1c    #CAD  - Continue aspirin, statin, possible PVD contributing in patient    #HTN  - Continue home losartan    Code: Full  VTE: None  Diet: Regular

## 2022-12-22 NOTE — ED PROVIDER NOTE - PHYSICAL EXAMINATION
Gen: NAD, AOx3, able to make needs known, non-toxic  Head: NCAT  HEENT: EOMI, oral mucosa moist, normal conjunctiva  Lung: CTAB, no respiratory distress, no wheezes/rhonchi/rales B/L, speaking in full sentences  CV: RRR, no murmurs  Abd: non distended, soft, nontender, no guarding, no CVA tenderness  MSK: L foot: erythema on dorsum and plantar aspect w/ associated tenderness and mild edema, sensation intact. Remaining extremities w/o obvious deformities  Neuro: Appears non focal  Skin: Warm, well perfused  Psych: normal affect

## 2022-12-22 NOTE — ED PROVIDER NOTE - CLINICAL SUMMARY MEDICAL DECISION MAKING FREE TEXT BOX
81 y/o M w/ PMH as above presenting w/ L foot pain and redness. Pt overall well appearing, no acute distress. Given recent injury and symptoms, concerned for cellulitis and outpatient abx treatment failure. Seen by podiatry in ED, requesting admission at this time. Pt agreeable w/ admission. Plan for labs, imaging, meds. Will reassess the need for additional interventions as clinically warranted.

## 2022-12-22 NOTE — ED ADULT NURSE NOTE - CHIEF COMPLAINT QUOTE
pt states " 4 days ago I step on something. dr Monet remove it and sent me to ed. ." pt c/o  redness and swelling of left foot.  history of htn.

## 2022-12-22 NOTE — ED ADULT NURSE NOTE - OBJECTIVE STATEMENT
Pt AAOx4, 82 year old male presents to ED with complaint of foot pain in left foot after he stepped on a toothpick Monday. Patient had doctor remove toothpick, sent to ED to confirm that toothpick is out. Denies chest pain, shortness of breath, nausea/vomiting. PMH HTN

## 2022-12-22 NOTE — ED ADULT TRIAGE NOTE - CHIEF COMPLAINT QUOTE
pt states " 4 days ago I step on something. dr Monet remove it and sent me to ed. ." pt c/o  redness and swelling of left foot.  history of htn.
No

## 2022-12-22 NOTE — H&P ADULT - NSHPPHYSICALEXAM_GEN_ALL_CORE
T(C): 36.6 (12-23-22 @ 05:03), Max: 37.2 (12-23-22 @ 00:05)  HR: 63 (12-23-22 @ 05:03) (63 - 82)  BP: 136/70 (12-23-22 @ 05:03) (136/70 - 189/81)  RR: 18 (12-23-22 @ 05:03) (18 - 18)  SpO2: 95% (12-23-22 @ 05:03) (95% - 99%)    CONSTITUTIONAL: Well groomed, no apparent distress  EYES: PERRLA and symmetric, EOMI, No conjunctival or scleral injection, non-icteric  ENMT: Oral mucosa with moist membranes. Poor dentition  NECK: Supple, symmetric and without tracheal deviation   RESP: No respiratory distress, no use of accessory muscles; CTA b/l, no WRR  CV: RRR, +S1S2, no MRG; no JVD; no peripheral edema  GI: Soft, NT, ND, no rebound, no guarding  LYMPH: No cervical LAD or tenderness; no axillary LAD or tenderness; no inguinal LAD or tenderness  MSK: Normal ROM without pain, no spinal tenderness, normal muscle strength/tone  SKIN: LLE with puncture wound and surrounding tenderness and erythema across 1st metatarsal region.   NEURO: CN II-XII intact;  sensation intact in upper and lower extremities b/l to light touch   PSYCH: Appropriate insight/judgment; A+O x 3, mood and affect appropriate, recent/remote memory intact

## 2022-12-22 NOTE — ED ADULT TRIAGE NOTE - MEANS OF ARRIVAL
Anesthesia Post Evaluation    Patient: Amanda Holt    Procedure(s) Performed: Procedure(s) (LRB):  COLONOSCOPY (N/A)    Final Anesthesia Type: general    Patient location during evaluation: GI PACU  Patient participation: Yes- Able to Participate  Level of consciousness: awake and alert  Post-procedure vital signs: reviewed and stable  Pain management: adequate  Airway patency: patent    PONV status at discharge: No PONV  Anesthetic complications: no      Cardiovascular status: blood pressure returned to baseline and hemodynamically stable  Respiratory status: unassisted and spontaneous ventilation  Hydration status: euvolemic  Follow-up not needed.          Vitals Value Taken Time   /60 07/23/20 1159   Temp 36.4 °C (97.5 °F) 07/23/20 1129   Pulse 97 07/23/20 1159   Resp 18 07/23/20 1159   SpO2 98 % 07/23/20 1159         No case tracking events are documented in the log.      Pain/Rommel Score: No data recorded      
ambulatory

## 2022-12-22 NOTE — H&P ADULT - HISTORY OF PRESENT ILLNESS
83 y/o M w/ PMHx of HTN presenting w/ L foot pain. Stepped on toothpick on Monday leading to puncture wound that became more erythematous and painful over time. Was seen outpatient and had foreign body removed and was started on cipro but foot pain worsened. Saw podiatrist today who was concerned for developing cellulitis and sent pt to hospital for eval. Pt denies hx of PVD or DM. Denies fevers, chills, headache, dizziness, blurred vision, chest pain, cough, shortness of breath, abdominal pain, n/v/d/c, urinary symptoms, rash.

## 2022-12-22 NOTE — ED ADULT NURSE NOTE - NSICDXFAMILYHX_GEN_ALL_CORE_FT
FAMILY HISTORY:  Father  Still living? No  Family history of heart disease, Age at diagnosis: Age Unknown    Mother  Still living? No  Family history of mental disorder, Age at diagnosis: Age Unknown

## 2022-12-23 DIAGNOSIS — L03.90 CELLULITIS, UNSPECIFIED: ICD-10-CM

## 2022-12-23 LAB
A1C WITH ESTIMATED AVERAGE GLUCOSE RESULT: 5.3 % — SIGNIFICANT CHANGE UP (ref 4–5.6)
ALBUMIN SERPL ELPH-MCNC: 2.9 G/DL — LOW (ref 3.3–5)
ALP SERPL-CCNC: 61 U/L — SIGNIFICANT CHANGE UP (ref 40–120)
ALT FLD-CCNC: 22 U/L — SIGNIFICANT CHANGE UP (ref 12–78)
ANION GAP SERPL CALC-SCNC: 6 MMOL/L — SIGNIFICANT CHANGE UP (ref 5–17)
AST SERPL-CCNC: 25 U/L — SIGNIFICANT CHANGE UP (ref 15–37)
BILIRUB SERPL-MCNC: 0.7 MG/DL — SIGNIFICANT CHANGE UP (ref 0.2–1.2)
BUN SERPL-MCNC: 22 MG/DL — SIGNIFICANT CHANGE UP (ref 7–23)
CALCIUM SERPL-MCNC: 9.4 MG/DL — SIGNIFICANT CHANGE UP (ref 8.5–10.1)
CHLORIDE SERPL-SCNC: 110 MMOL/L — HIGH (ref 96–108)
CO2 SERPL-SCNC: 26 MMOL/L — SIGNIFICANT CHANGE UP (ref 22–31)
CREAT SERPL-MCNC: 0.89 MG/DL — SIGNIFICANT CHANGE UP (ref 0.5–1.3)
CRP SERPL-MCNC: 95 MG/L — HIGH
EGFR: 87 ML/MIN/1.73M2 — SIGNIFICANT CHANGE UP
ESTIMATED AVERAGE GLUCOSE: 105 MG/DL — SIGNIFICANT CHANGE UP (ref 68–114)
FOLATE SERPL-MCNC: >20 NG/ML — SIGNIFICANT CHANGE UP
GLUCOSE SERPL-MCNC: 91 MG/DL — SIGNIFICANT CHANGE UP (ref 70–99)
HCT VFR BLD CALC: 38.5 % — LOW (ref 39–50)
HGB BLD-MCNC: 12.5 G/DL — LOW (ref 13–17)
MCHC RBC-ENTMCNC: 32.5 G/DL — SIGNIFICANT CHANGE UP (ref 32–36)
MCHC RBC-ENTMCNC: 32.7 PG — SIGNIFICANT CHANGE UP (ref 27–34)
MCV RBC AUTO: 100.8 FL — HIGH (ref 80–100)
NRBC # BLD: 0 /100 WBCS — SIGNIFICANT CHANGE UP (ref 0–0)
PLATELET # BLD AUTO: 210 K/UL — SIGNIFICANT CHANGE UP (ref 150–400)
POTASSIUM SERPL-MCNC: 4 MMOL/L — SIGNIFICANT CHANGE UP (ref 3.5–5.3)
POTASSIUM SERPL-SCNC: 4 MMOL/L — SIGNIFICANT CHANGE UP (ref 3.5–5.3)
PROT SERPL-MCNC: 6.7 GM/DL — SIGNIFICANT CHANGE UP (ref 6–8.3)
RBC # BLD: 3.82 M/UL — LOW (ref 4.2–5.8)
RBC # FLD: 12.6 % — SIGNIFICANT CHANGE UP (ref 10.3–14.5)
SODIUM SERPL-SCNC: 142 MMOL/L — SIGNIFICANT CHANGE UP (ref 135–145)
VIT B12 SERPL-MCNC: 1474 PG/ML — HIGH (ref 232–1245)
WBC # BLD: 9.22 K/UL — SIGNIFICANT CHANGE UP (ref 3.8–10.5)
WBC # FLD AUTO: 9.22 K/UL — SIGNIFICANT CHANGE UP (ref 3.8–10.5)

## 2022-12-23 PROCEDURE — 99233 SBSQ HOSP IP/OBS HIGH 50: CPT

## 2022-12-23 PROCEDURE — 73720 MRI LWR EXTREMITY W/O&W/DYE: CPT | Mod: 26,LT

## 2022-12-23 RX ORDER — LOSARTAN POTASSIUM 100 MG/1
25 TABLET, FILM COATED ORAL ONCE
Refills: 0 | Status: COMPLETED | OUTPATIENT
Start: 2022-12-23 | End: 2022-12-23

## 2022-12-23 RX ORDER — LOSARTAN POTASSIUM 100 MG/1
1 TABLET, FILM COATED ORAL
Qty: 0 | Refills: 0 | DISCHARGE

## 2022-12-23 RX ORDER — LOSARTAN POTASSIUM 100 MG/1
25 TABLET, FILM COATED ORAL DAILY
Refills: 0 | Status: DISCONTINUED | OUTPATIENT
Start: 2022-12-23 | End: 2022-12-24

## 2022-12-23 RX ADMIN — Medication 250 MILLIGRAM(S): at 00:15

## 2022-12-23 RX ADMIN — Medication 250 MILLIGRAM(S): at 13:16

## 2022-12-23 RX ADMIN — LOSARTAN POTASSIUM 25 MILLIGRAM(S): 100 TABLET, FILM COATED ORAL at 01:06

## 2022-12-23 RX ADMIN — Medication 250 MILLIGRAM(S): at 23:01

## 2022-12-23 RX ADMIN — CEFEPIME 100 MILLIGRAM(S): 1 INJECTION, POWDER, FOR SOLUTION INTRAMUSCULAR; INTRAVENOUS at 23:01

## 2022-12-23 RX ADMIN — LOSARTAN POTASSIUM 25 MILLIGRAM(S): 100 TABLET, FILM COATED ORAL at 18:15

## 2022-12-23 RX ADMIN — CEFEPIME 100 MILLIGRAM(S): 1 INJECTION, POWDER, FOR SOLUTION INTRAMUSCULAR; INTRAVENOUS at 12:32

## 2022-12-23 NOTE — PATIENT PROFILE ADULT - FALL HARM RISK - HARM RISK INTERVENTIONS

## 2022-12-23 NOTE — PROGRESS NOTE ADULT - ASSESSMENT
82y Male with L foot cellulitis   - Patient seen and evaluated  - Afebrile, WBC 10.86, ESR 38, CRP 95  - L foot with now resolving cellulitis to midfoot, s/p I&D to plantar ball region of forefoot where previous foreign body entry site was visualized, no drainage no malodor. R foot no open wound.   - Left foot xray: no foreign body visualized, no OM, no gas (prelim)   - L foot wound culture pending   - Continue IV abx   - recommended ID consult and follow up   - Stable for discharge from Podiatry standpoint pending MRI/ erythema resolution.   - Discussed with attending.  82y Male with L foot cellulitis   - Patient seen and evaluated  - Afebrile, WBC 10.86, ESR 38, CRP 95  - L foot with now resolving cellulitis to midfoot, s/p I&D to plantar ball region of forefoot where previous foreign body entry site was visualized, no drainage no malodor. R foot no open wound.   - Left foot xray: no foreign body visualized, no OM, no gas (prelim)   - L foot wound culture pending   - Continue IV abx   - recommended ID consult and follow up   - Pt is stable for discharge tomorrow with PO Augmentin x7 days.   - Discussed with attending.  82y Male with L foot cellulitis   - Patient seen and evaluated  - Afebrile, WBC 10.86, ESR 38, CRP 95  - L foot with now resolving cellulitis to midfoot, s/p I&D to plantar ball region of forefoot where previous foreign body entry site was visualized, no drainage no malodor. R foot no open wound.   - Left foot xray: no foreign body , no OM, no gas   - Left foot MRI showing no OM, no gas, no foreign body.   - L foot wound culture pending   - Continue IV abx   - recommended ID consult and follow up   - Pt is stable for discharge tomorrow with PO Augmentin x7 days.   - Discussed with attending.

## 2022-12-23 NOTE — PROGRESS NOTE ADULT - SUBJECTIVE AND OBJECTIVE BOX
CHIEF COMPLAINT:      PHYSICAL EXAM:    GENERAL: Moderately built, no acute distress   CHEST/LUNG: Clear to ausculation bilaterally, no wheezing, no crackles   HEART: Regular rate and rhythm; No murmurs, rubs  ABDOMEN: Soft, Nontender, Nondistended; Bowel sounds present  EXTREMITIES:  Moving all four extremities spontaneously, No clubbing, cyanosis, or edema  NERVOUS SYSTEM:  Grossly non focal.  Psychiatry: AAO x 3, mood is appropriate       OBJECTIVE DATA:   Vital Signs Last 24 Hrs  T(C): 36.6 (23 Dec 2022 05:03), Max: 37.2 (23 Dec 2022 00:05)  T(F): 97.9 (23 Dec 2022 05:03), Max: 98.9 (23 Dec 2022 00:05)  HR: 63 (23 Dec 2022 05:03) (63 - 82)  BP: 136/70 (23 Dec 2022 05:03) (136/70 - 189/81)  BP(mean): --  RR: 18 (23 Dec 2022 05:03) (18 - 18)  SpO2: 95% (23 Dec 2022 05:03) (95% - 99%)    Parameters below as of 23 Dec 2022 05:03  Patient On (Oxygen Delivery Method): room air               Daily Height in cm: 182.88 (23 Dec 2022 00:05)    Daily   LABS:                        12.5   9.22  )-----------( 210      ( 23 Dec 2022 08:00 )             38.5             12-23    142  |  110<H>  |  22  ----------------------------<  91  4.0   |  26  |  0.89    Ca    9.4      23 Dec 2022 08:00    TPro  6.7  /  Alb  2.9<L>  /  TBili  0.7  /  DBili  x   /  AST  25  /  ALT  22  /  AlkPhos  61  12-23                        CAPILLARY BLOOD GLUCOSE             Interval Radiology studies: reviewed by me    MEDICATIONS  (STANDING):  aspirin enteric coated 81 milliGRAM(s) Oral daily  atorvastatin 80 milliGRAM(s) Oral at bedtime  cefepime   IVPB 2000 milliGRAM(s) IV Intermittent every 12 hours  losartan 25 milliGRAM(s) Oral daily  vancomycin  IVPB 1000 milliGRAM(s) IV Intermittent every 12 hours    MEDICATIONS  (PRN):  acetaminophen     Tablet .. 650 milliGRAM(s) Oral every 6 hours PRN Temp greater or equal to 38C (100.4F), Mild Pain (1 - 3)  melatonin 3 milliGRAM(s) Oral at bedtime PRN Insomnia       CHIEF COMPLAINT: left foot punctured wound and associated cellulitis foot with pain  no n/v/d/cp/sob  no fever       PHYSICAL EXAM:    GENERAL: Moderately built, no acute distress   CHEST/LUNG: Clear to ausculation bilaterally, no wheezing, no crackles   HEART: Regular rate and rhythm; No murmurs, rubs  ABDOMEN: Soft, Nontender, Nondistended; Bowel sounds present  EXTREMITIES:  Erythema improving left foot, + dressing. No clubbing, cyanosis, or edema  NERVOUS SYSTEM:  Grossly non focal.  Psychiatry: AAO x 3, mood is appropriate       OBJECTIVE DATA:   Vital Signs Last 24 Hrs  T(C): 36.6 (23 Dec 2022 05:03), Max: 37.2 (23 Dec 2022 00:05)  T(F): 97.9 (23 Dec 2022 05:03), Max: 98.9 (23 Dec 2022 00:05)  HR: 63 (23 Dec 2022 05:03) (63 - 82)  BP: 136/70 (23 Dec 2022 05:03) (136/70 - 189/81)  BP(mean): --  RR: 18 (23 Dec 2022 05:03) (18 - 18)  SpO2: 95% (23 Dec 2022 05:03) (95% - 99%)    Parameters below as of 23 Dec 2022 05:03  Patient On (Oxygen Delivery Method): room air               Daily Height in cm: 182.88 (23 Dec 2022 00:05)                 12.5   9.22  )-----------( 210      ( 23 Dec 2022 08:00 )             38.5             12-23    142  |  110<H>  |  22  ----------------------------<  91  4.0   |  26  |  0.89    Ca    9.4      23 Dec 2022 08:00    TPro  6.7  /  Alb  2.9<L>  /  TBili  0.7  /  DBili  x   /  AST  25  /  ALT  22  /  AlkPhos  61  12-23                  Interval Radiology studies: reviewed by me    < from: Xray Foot AP + Lateral + Oblique, Left (12.22.22 @ 20:54) >  IMPRESSION:  No acute radiographic findings, MRI ordered    < end of copied text >      MEDICATIONS  (STANDING):  aspirin enteric coated 81 milliGRAM(s) Oral daily  atorvastatin 80 milliGRAM(s) Oral at bedtime  cefepime   IVPB 2000 milliGRAM(s) IV Intermittent every 12 hours  losartan 25 milliGRAM(s) Oral daily  vancomycin  IVPB 1000 milliGRAM(s) IV Intermittent every 12 hours    MEDICATIONS  (PRN):  acetaminophen     Tablet .. 650 milliGRAM(s) Oral every 6 hours PRN Temp greater or equal to 38C (100.4F), Mild Pain (1 - 3)  melatonin 3 milliGRAM(s) Oral at bedtime PRN Insomnia

## 2022-12-23 NOTE — PROGRESS NOTE ADULT - SUBJECTIVE AND OBJECTIVE BOX
Patient is a 82y old  Male who presents with a chief complaint of R/o osteomyelitis, cellulitis (22 Dec 2022 23:28)       INTERVAL HPI/OVERNIGHT EVENTS:  Patient seen and evaluated at bedside.  Pt is resting comfortable in NAD. Denies N/V/F/C.    Allergies    penicillins (Unknown)    Intolerances        Vital Signs Last 24 Hrs  T(C): 36.6 (23 Dec 2022 05:03), Max: 37.2 (23 Dec 2022 00:05)  T(F): 97.9 (23 Dec 2022 05:03), Max: 98.9 (23 Dec 2022 00:05)  HR: 63 (23 Dec 2022 05:03) (63 - 82)  BP: 136/70 (23 Dec 2022 05:03) (136/70 - 189/81)  BP(mean): --  RR: 18 (23 Dec 2022 05:03) (18 - 18)  SpO2: 95% (23 Dec 2022 05:03) (95% - 99%)    Parameters below as of 23 Dec 2022 05:03  Patient On (Oxygen Delivery Method): room air        LABS:                        13.1   10.86 )-----------( 208      ( 22 Dec 2022 19:59 )             38.9     12-22    143  |  111<H>  |  29<H>  ----------------------------<  108<H>  4.6   |  24  |  1.00    Ca    9.7      22 Dec 2022 19:59          CAPILLARY BLOOD GLUCOSE          Lower Extremity Physical Exam:  Vascular: DP/PT 2/4, B/L, CFT <3 seconds B/L, Temperature gradient warm to warm L , warm to cool R.   Neuro: Epicritic sensation intact to the level of digits, B/L.  Musculoskeletal/Ortho: unremarkable.  Skin: L foot with now resolving cellulitis to midfoot, s/p I&D to plantar ball region of forefoot where previous foreign body entry site was visualized, no drainage no malodor. R foot no open wound.

## 2022-12-23 NOTE — PROGRESS NOTE ADULT - ASSESSMENT
#Cellulitis left foot from punctured wound #Ruled out Osteomyelitis  - S/p I and D by podiatrist.   - Cont Vanc and Cefepime empirically  - F/u blood and wound cx  - F/u  MRI of foot.    #CAD  - Continue aspirin, statin, possible PVD contributing in patient    #HTN  - Continue home losartan    #Macrocytic anemia. no active gross bleeding. check vitamin B12 and folate level.     Code: Full  VTE: None  Diet: Regular

## 2022-12-23 NOTE — PATIENT PROFILE ADULT - FUNCTIONAL ASSESSMENT - DAILY ACTIVITY 1.
I called and spoke with patient. He said that when he was in to see you on 8/21, you had discussed his restless legs. He said that he thought that you were going to send a prescription in. No scripts sent.    He states he notices that it has worsened and now he feels his restless legs all day.    Please advise.  
I called and spoke with patient. I made him aware that Dr. Land would prescribe Sinemet at bedtime. I asked him what pharmacy I could send this to. He said that he would like it sent to a local pharmacy and he will see how it goes and then send it to a mail order.    Script sent to a local pharmacy.     Nothing further at this time.  
May give sinemet 25/100 at hs  30 x3  
Patient calling stating that his restless legs was discussed during his appointment but if a script was going to be prescribed was not.  Please call back to discuss as patient's legs are getting worse.   
4 = No assist / stand by assistance

## 2022-12-24 ENCOUNTER — TRANSCRIPTION ENCOUNTER (OUTPATIENT)
Age: 82
End: 2022-12-24

## 2022-12-24 VITALS
SYSTOLIC BLOOD PRESSURE: 165 MMHG | TEMPERATURE: 98 F | HEART RATE: 66 BPM | RESPIRATION RATE: 17 BRPM | DIASTOLIC BLOOD PRESSURE: 71 MMHG | OXYGEN SATURATION: 96 %

## 2022-12-24 PROCEDURE — 99239 HOSP IP/OBS DSCHRG MGMT >30: CPT

## 2022-12-24 RX ORDER — LEVOFLOXACIN 5 MG/ML
1 INJECTION, SOLUTION INTRAVENOUS
Qty: 7 | Refills: 0
Start: 2022-12-24

## 2022-12-24 RX ADMIN — LOSARTAN POTASSIUM 25 MILLIGRAM(S): 100 TABLET, FILM COATED ORAL at 06:32

## 2022-12-24 RX ADMIN — Medication 250 MILLIGRAM(S): at 12:36

## 2022-12-24 RX ADMIN — CEFEPIME 100 MILLIGRAM(S): 1 INJECTION, POWDER, FOR SOLUTION INTRAMUSCULAR; INTRAVENOUS at 10:40

## 2022-12-24 NOTE — DISCHARGE NOTE NURSING/CASE MANAGEMENT/SOCIAL WORK - PATIENT PORTAL LINK FT
You can access the FollowMyHealth Patient Portal offered by [facility] by registering at the following website: [email or web address]. By joining [facility]’s FollowMyHealth portal, you will also be able to view your health information using other applications (apps) compatible with our system.

## 2022-12-24 NOTE — DISCHARGE NOTE PROVIDER - NSDCMRMEDTOKEN_GEN_ALL_CORE_FT
aspirin 81 mg oral delayed release tablet: 1 tab(s) orally once a day  atorvastatin 80 mg oral tablet: 1 tab(s) orally once a day (at bedtime)  clopidogrel 75 mg oral tablet: 1 tab(s) orally once a day x 3 weeks   levoFLOXacin 500 mg oral tablet: 1 tab(s) orally once a day   losartan 25 mg oral tablet: 1 tab(s) orally once a day

## 2022-12-24 NOTE — DISCHARGE NOTE PROVIDER - HOSPITAL COURSE
History of Present Illness:   83 y/o M w/ PMHx of HTN presenting w/ L foot pain. Stepped on toothpick on Monday leading to puncture wound that became more erythematous and painful over time. Was seen outpatient and had foreign body removed and was started on cipro but foot pain worsened. Saw podiatrist today who was concerned for developing cellulitis and sent pt to hospital for eval. Pt denies hx of PVD or DM. Denies fevers, chills, headache, dizziness, blurred vision, chest pain, cough, shortness of breath, abdominal pain, n/v/d/c, urinary symptoms, rash.      Cellulitis left foot from punctured wound #Ruled out Osteomyelitis  - S/p I and D by podiatrist.   - received iv Vanc and Cefepime inpatient. no OM on MRI foot. cleared by podiatrist to discharge on augmentin but unfortunately patient has PCN allergy as a child. Discharged on levaquin x 7 days       #CAD  - Continue aspirin, statin, possible PVD contributing in patient    #HTN  - Continue home losartan    #Macrocytic anemia. normal vitamin B12 and folate levels. no active gross bleeding.     Seen and examined by me today. Vitals stable.   I have discussed all the inpatient radiographic findings with the patient and stressed that patient follows with the PCP for further outpatient care. I have educated patient to use [facility] patient portal (as instructed on the discharge paperwork) to access medical records outside the hospital.   All questions welcomed and answered appropriately. Patient verbalized understanding of post discharge physician's follows up and discharge instructions.   DC time spent by me face to face excluding billable procedures 38 mins

## 2022-12-24 NOTE — DISCHARGE NOTE PROVIDER - NSDCFUADDINST_GEN_ALL_CORE_FT
1) It is important to see your primary physician as well as other necessary consultants within next 7-10 days to perform a comprehensive medical review.  Call their offices for an appointment as soon as you leave the hospital.  If you do not have a primary physician or unable to reach your PCP, contact the [facility] Physician Referral Service at [phone].  Your medical issues appear to be stable at this time, but if your symptoms recur or worsen, contact your physicians and/or return to the hospital if necessary.  If you encounter any issues or questions with your medication, call your physicians before stopping the medication.    2) Please access [facility] Patient portal (as instructed on the discharge paperwork) to access your medical records at any time after discharge.

## 2022-12-24 NOTE — DISCHARGE NOTE NURSING/CASE MANAGEMENT/SOCIAL WORK - NSDCPEFALRISK_GEN_ALL_CORE
For information on Fall & Injury Prevention, visit: [email or web address] OR  [email or web address] OR  https://www.cdc.gov/steadi/patient.html

## 2022-12-28 LAB
CULTURE RESULTS: SIGNIFICANT CHANGE UP
SPECIMEN SOURCE: SIGNIFICANT CHANGE UP

## 2022-12-30 DIAGNOSIS — M79.672 PAIN IN LEFT FOOT: ICD-10-CM

## 2022-12-30 DIAGNOSIS — Z98.49 CATARACT EXTRACTION STATUS, UNSPECIFIED EYE: ICD-10-CM

## 2022-12-30 DIAGNOSIS — Z79.82 LONG TERM (CURRENT) USE OF ASPIRIN: ICD-10-CM

## 2022-12-30 DIAGNOSIS — I25.10 ATHEROSCLEROTIC HEART DISEASE OF NATIVE CORONARY ARTERY WITHOUT ANGINA PECTORIS: ICD-10-CM

## 2022-12-30 DIAGNOSIS — I10 ESSENTIAL (PRIMARY) HYPERTENSION: ICD-10-CM

## 2022-12-30 DIAGNOSIS — D53.9 NUTRITIONAL ANEMIA, UNSPECIFIED: ICD-10-CM

## 2022-12-30 DIAGNOSIS — L03.116 CELLULITIS OF LEFT LOWER LIMB: ICD-10-CM

## 2022-12-30 DIAGNOSIS — Z79.02 LONG TERM (CURRENT) USE OF ANTITHROMBOTICS/ANTIPLATELETS: ICD-10-CM

## 2022-12-30 DIAGNOSIS — Z88.0 ALLERGY STATUS TO PENICILLIN: ICD-10-CM

## 2022-12-30 DIAGNOSIS — Z20.822 CONTACT WITH AND (SUSPECTED) EXPOSURE TO COVID-19: ICD-10-CM

## 2023-01-04 NOTE — H&P ADULT - VASCULAR
Carac Counseling:  I discussed with the patient the risks of Carac including but not limited to erythema, scaling, itching, weeping, crusting, and pain. detailed exam

## 2023-01-26 ASSESSMENT — REFRACTION_AUTOREFRACTION
OS_AXIS: 81
OS_CYLINDER: -0.25
OD_SPHERE: +1.00
OD_CYLINDER: -0.75
OD_AXIS: 67
OS_SPHERE: +1.00

## 2023-01-26 ASSESSMENT — KERATOMETRY
OS_K1POWER_DIOPTERS: 43.25
OD_K1POWER_DIOPTERS: 43.75
OD_AXISANGLE_DEGREES: 177
OS_K2POWER_DIOPTERS: 47.00
OS_AXISANGLE_DEGREES: 161
OD_K2POWER_DIOPTERS: 45.25

## 2023-01-26 ASSESSMENT — REFRACTION_MANIFEST
OS_ADD: +3.00
OD_ADD: +3.00
OD_AXIS: 65
OS_VA1: 20/25-
OD_SPHERE: +0.50
OS_SPHERE: PLANO
OD_VA1: 20/25-
OD_CYLINDER: -0.75

## 2023-01-26 ASSESSMENT — AXIALLENGTH_DERIVED
OD_AL: 22.9965
OS_AL: 22.69
OD_AL: 23.1831

## 2023-01-26 ASSESSMENT — SPHEQUIV_DERIVED
OD_SPHEQUIV: 0.125
OD_SPHEQUIV: 0.625
OS_SPHEQUIV: 0.875

## 2024-05-05 NOTE — H&P ADULT - NEGATIVE CARDIOVASCULAR SYMPTOMS
cell phone, clothes/eyeglasses
no peripheral edema/no palpitations/no chest pain/no dyspnea on exertion

## 2024-11-12 NOTE — ED PROVIDER NOTE - NEUROLOGICAL SPEECH
Show Applicator Variable?: Yes Add 52 Modifier (Optional): no Consent: The patient's consent was obtained including but not limited to risks of crusting, scabbing, blistering, scarring, darker or lighter pigmentary change, recurrence, incomplete removal and infection. Application Tool (Optional): Liquid Nitrogen Sprayer Number Of Freeze-Thaw Cycles: 3 freeze-thaw cycles Medical Necessity Information: It is in your best interest to select a reason for this procedure from the list below. All of these items fulfill various CMS LCD requirements except the new and changing color options. Medical Necessity Clause: This procedure was medically necessary because the lesions that were treated were: Detail Level: Detailed Aperture Size (Optional): A Duration Of Freeze Thaw-Cycle (Seconds): 5-10 Spray Paint Text: The liquid nitrogen was applied to the skin utilizing a spray paint frosting technique. Post-Care Instructions: I reviewed with the patient in detail post-care instructions. Patient is to wear sunprotection, and avoid picking at any of the treated lesions. Pt may apply Vaseline to crusted or scabbing areas. clear

## 2025-07-05 NOTE — ED CDU PROVIDER INITIAL DAY NOTE - NS ED ATTENDING STATEMENT MOD
I advise rest, fluids, Flonase nasal spray once daily, nasal saline rinses 2-3 times daily (I like the brand Zac Med), humidifier in bedroom at night, Claritin or Zyrtec once daily for the next two weeks to help with post nasal drip and/or feeling of fullness in the ears from congestion.          Dissolving 1 tsp of salt into 8 oz. of warm water and gargling as needed can be helpful for sore throat. Honey can help to coat and soothe the throat as well. Honey is also an excellent cough suppressant. You can swallow 1 tsp honey plain or dissolve it into warm tea/liquid as needed.           Please follow up with your primary provider, go to the emergency room, or return to urgent care if symptoms do not improve or worsen. ?You may schedule an appointment online at [email or web address] or call [phone]. Go to the Emergency Department if symptoms significantly worsen or if you develop symptoms including but not limited to chest pain or shortness of breath.    
I have personally performed a face to face diagnostic evaluation on this patient. I have reviewed the ACP note and agree with the history, exam and plan of care, except as noted.